# Patient Record
Sex: MALE | Race: WHITE | NOT HISPANIC OR LATINO | Employment: FULL TIME | URBAN - METROPOLITAN AREA
[De-identification: names, ages, dates, MRNs, and addresses within clinical notes are randomized per-mention and may not be internally consistent; named-entity substitution may affect disease eponyms.]

---

## 2017-07-24 ENCOUNTER — OFFICE VISIT (OUTPATIENT)
Dept: CARDIOLOGY | Facility: MEDICAL CENTER | Age: 52
End: 2017-07-24
Payer: COMMERCIAL

## 2017-07-24 VITALS
DIASTOLIC BLOOD PRESSURE: 68 MMHG | WEIGHT: 228 LBS | OXYGEN SATURATION: 94 % | BODY MASS INDEX: 30.88 KG/M2 | HEIGHT: 72 IN | SYSTOLIC BLOOD PRESSURE: 108 MMHG | HEART RATE: 64 BPM

## 2017-07-24 DIAGNOSIS — E88.810 DYSMETABOLIC SYNDROME X: ICD-10-CM

## 2017-07-24 DIAGNOSIS — I10 ESSENTIAL HYPERTENSION: ICD-10-CM

## 2017-07-24 PROCEDURE — 99213 OFFICE O/P EST LOW 20 MIN: CPT | Performed by: INTERNAL MEDICINE

## 2017-07-24 ASSESSMENT — ENCOUNTER SYMPTOMS
BLURRED VISION: 0
DIZZINESS: 0
NAUSEA: 0
PND: 0
EYE DISCHARGE: 0
MYALGIAS: 0
HEARTBURN: 0
HEADACHES: 0
COUGH: 0
FEVER: 0
BRUISES/BLEEDS EASILY: 0
DEPRESSION: 0
PALPITATIONS: 0
CHILLS: 0
SHORTNESS OF BREATH: 0
NERVOUS/ANXIOUS: 0

## 2017-07-24 NOTE — MR AVS SNAPSHOT
"        Fabio Bolaños   2017 2:45 PM   Office Visit   MRN: [id number]    Department:  [facility]   Dept Phone:  [phone]    Description:  Male : 1965   Provider:  Noah Jon M.D.           Reason for Visit     Follow-Up           Allergies as of 2017     No Known Allergies      You were diagnosed with     Essential hypertension   [1490136]       Dysmetabolic syndrome X   [277.7.ICD-9-CM]         Vital Signs     Blood Pressure Pulse Height Weight Body Mass Index Oxygen Saturation    108/68 mmHg 64 1.829 m (6' 0.01\") 103.42 kg (228 lb) 30.92 kg/m2 94%    Smoking Status                   Never Smoker            Basic Information     Date Of Birth Sex Race Ethnicity Preferred Language    1965 Male White Non- English      Problem List              ICD-10-CM Priority Class Noted - Resolved    Dysmetabolic syndrome X (Chronic) E88.81   2011 - Present    Hyperlipoproteinemia (Chronic) E78.5   2011 - Present    Sleep apnea (Chronic) G47.30   2011 - Present    HTN (hypertension) I10   3/9/2015 - Present      Health Maintenance        Date Due Completion Dates    IMM DTaP/Tdap/Td Vaccine (1 - Tdap) 10/9/1982 ---    COLONOSCOPY 10/9/2013 ---    IMM INFLUENZA (1) 2017 ---            Current Immunizations     No immunizations on file.      Below and/or attached are the medications your provider expects you to take. Review all of your home medications and newly ordered medications with your provider and/or pharmacist. Follow medication instructions as directed by your provider and/or pharmacist. Please keep your medication list with you and share with your provider. Update the information when medications are discontinued, doses are changed, or new medications (including over-the-counter products) are added; and carry medication information at all times in the event of emergency situations     Allergies:  No Known Allergies          Medications  Valid as " of: July 24, 2017 -  3:31 PM    Generic Name Brand Name Tablet Size Instructions for use    Aspirin (Tablet Delayed Response) ECOTRIN 81 MG Take 81 mg by mouth every day.          Celecoxib (Cap) CELEBREX 200 MG Take 200 mg by mouth as needed.        Choline Fenofibrate (CAPSULE DELAYED RELEASE) Fenofibric Acid 135 MG Take 1 Cap by mouth every day.        Choline Fenofibrate   Take  by mouth.        Olmesartan Medoxomil (Tab) BENICAR 20 MG Take 0.5 Tabs by mouth every day.        Omega-3-acid Ethyl Esters (Cap) LOVAZA 1 GM Take 4 tablets daily        Simvastatin (Tab) ZOCOR 40 MG Take 1 Tab by mouth every evening.        .                 Medicines prescribed today were sent to:     [facility] [address] [place], [place] - [address] [facility]    [address] [facility] [address]    Phone: [phone] Fax: [phone]    Open 24 Hours?: No    [facility] - [place], [place] - [address] AT PORTAL TO [facility]    [address] [facility] [address]    Phone: [phone] Fax: [phone]    Open 24 Hours?: No    [id number] - [place], [place] - [address]    [address] [facility] [address]    Phone: [phone] Fax: [phone]    Open 24 Hours?: No      Medication refill instructions:       If your prescription bottle indicates you have medication refills left, it is not necessary to call your provider’s office. Please contact your pharmacy and they will refill your medication.    If your prescription bottle indicates you do not have any refills left, you may request refills at any time through one of the following ways: The online "[employer] system (except Urgent Care), by calling your provider’s office, or by asking your pharmacy to contact your provider’s office with a refill request. Medication refills are processed only during regular business hours and may not be available until the next business day. Your provider  may request additional information or to have a follow-up visit with you prior to refilling your medication.   *Please Note: Medication refills are assigned a new Rx number when refilled electronically. Your pharmacy may indicate that no refills were authorized even though a new prescription for the same medication is available at the pharmacy. Please request the medicine by name with the pharmacy before contacting your provider for a refill.        Your To Do List     Future Labs/Procedures Complete By Expires    CT-CARDIAC SCORING  As directed 7/24/2018         Grupo [employer] Access Code: [email or web address]  Expires: 8/23/2017  2:43 PM    Grupo A  A secure, online tool to manage your health information     [employer]’s Grupo A® is a secure, online tool that connects you to your personalized health information from the privacy of your home -- day or night - making it very easy for you to manage your healthcare. Once the activation process is completed, you can even access your medical information using the Grupo A linda, which is available for free in the Apple Linda store or Google Play store.     Grupo A provides the following levels of access (as shown below):   My Chart Features   [facility] Doctor [facility]  Urgent  Care [facility]  Primary Care  Doctor   Email your healthcare team securely and privately 24/7 X X X    Manage appointments: schedule your next appointment; view details of past/upcoming appointments X      Request prescription refills. X      View recent personal medical records, including lab and immunizations X X X X   View health record, including health history, allergies, medications X X X X   Read reports about your outpatient visits, procedures, consult and ER notes X X X X   See your discharge summary, which is a recap of your hospital and/or ER visit that includes your diagnosis, lab results, and care plan. X X       How to register for Grupo A:  1. Go to   [email or web address].  2. Click on the Sign Up Now box, which takes you to the New Member Sign Up page. You will need to provide the following information:  a. Enter your [employer] Access Code exactly as it appears at the top of this page. (You will not need to use this code after you’ve completed the sign-up process. If you do not sign up before the expiration date, you must request a new code.)   b. Enter your date of birth.   c. Enter your home email address.   d. Click Submit, and follow the next screen’s instructions.  3. Create a Sketchfab ID. This will be your Sketchfab login ID and cannot be changed, so think of one that is secure and easy to remember.  4. Create a [employer] password. You can change your password at any time.  5. Enter your Password Reset Question and Answer. This can be used at a later time if you forget your password.   6. Enter your e-mail address. This allows you to receive e-mail notifications when new information is available in Sketchfab.  7. Click Sign Up. You can now view your health information.    For assistance activating your S[employer] account, call [phone]

## 2017-07-24 NOTE — PROGRESS NOTES
Subjective:   Fabio Bolaños is a 51 y.o. male who presents today in annual follow-up for metabolic syndrome and labile hypertension.  He's been taking half the dose Benicar and half dose simvastatin because he noticed the pills were somewhat larger.  He has no symptoms of heart disease.  Weight fluctuates up and most recently down, but still carrying extra weight.    Recent lab work demonstrates a stable creatinine of 1.28. LDL is 105. He used to be 98. HDL remains low. Mild elevation of triglycerides.    Past Medical History   Diagnosis Date   • Dysmetabolic syndrome X 5/25/2011   • Hyperlipoproteinemia 5/25/2011   • Labile hypertension 5/25/2011   • Sleep apnea 5/25/2011     No past surgical history on file.  No family history on file.  History   Smoking status   • Never Smoker    Smokeless tobacco   • Never Used     No Known Allergies  Outpatient Encounter Prescriptions as of 7/24/2017   Medication Sig Dispense Refill   • Choline Fenofibrate (TRILIPIX PO) Take  by mouth.     • olmesartan (BENICAR) 20 MG Tab Take 0.5 Tabs by mouth every day. 45 Tab 3   • simvastatin (ZOCOR) 40 MG TABS Take 1 Tab by mouth every evening. 90 Tab 3   • omega-3 acid ethyl esters (LOVAZA) 1 GM capsule Take 4 tablets daily 56 Cap 0   • aspirin EC (ECOTRIN) 81 MG TBEC Take 81 mg by mouth every day.       • celecoxib (CELEBREX) 200 MG CAPS Take 200 mg by mouth as needed.     • Choline Fenofibrate (FENOFIBRIC ACID) 135 MG CAPSULE DELAYED RELEASE Take 1 Cap by mouth every day. 90 Cap 3     No facility-administered encounter medications on file as of 7/24/2017.     Review of Systems   Constitutional: Negative for fever, chills and malaise/fatigue.   Eyes: Negative for blurred vision and discharge.   Respiratory: Negative for cough and shortness of breath.    Cardiovascular: Negative for chest pain, palpitations, leg swelling and PND.   Gastrointestinal: Negative for heartburn and nausea.   Genitourinary: Negative for dysuria and  "urgency.   Musculoskeletal: Negative for myalgias.   Skin: Negative for itching and rash.   Neurological: Negative for dizziness and headaches.   Endo/Heme/Allergies: Negative for environmental allergies. Does not bruise/bleed easily.   Psychiatric/Behavioral: Negative for depression. The patient is not nervous/anxious.         Objective:   /68 mmHg  Pulse 64  Ht 1.829 m (6' 0.01\")  Wt 103.42 kg (228 lb)  BMI 30.92 kg/m2  SpO2 94%    Physical Exam   Constitutional: He is oriented to person, place, and time. He appears well-developed and well-nourished.   HENT:   Mouth/Throat: Oropharynx is clear and moist.   Eyes: Conjunctivae and EOM are normal.   Neck: No JVD present. No thyroid mass present.   Cardiovascular: Normal rate, regular rhythm, S1 normal, S2 normal and normal pulses.  PMI is not displaced.  Exam reveals no gallop.    No murmur heard.  Pulses:       Carotid pulses are 2+ on the right side, and 2+ on the left side.       Radial pulses are 2+ on the right side, and 2+ on the left side.        Femoral pulses are 2+ on the right side, and 2+ on the left side.       Dorsalis pedis pulses are 2+ on the right side, and 2+ on the left side.   No peripheral edema.   Pulmonary/Chest: Effort normal and breath sounds normal.   Abdominal: Soft. Normal appearance. He exhibits no abdominal bruit. There is no hepatosplenomegaly. There is no tenderness.   Musculoskeletal: Normal range of motion. He exhibits no edema.        Thoracic back: He exhibits no tenderness and no spasm.   Neurological: He is alert and oriented to person, place, and time.   Skin: No rash noted. No cyanosis. Nails show no clubbing.   Psychiatric: He has a normal mood and affect.       Assessment:     1. Essential hypertension     2. Dysmetabolic syndrome X  CT-CARDIAC SCORING       Medical Decision Making:  Today's Assessment / Status / Plan:   We'll arrange for CAT scan for coronary artery calcification scoring so that we can  how " aggressive to be regarding treatment of his LDL cholesterol in the setting of low HDL.

## 2017-07-24 NOTE — Clinical Note
[facility]   [address]   [address]  [place] [address]  Phone: [phone]  Fax: [phone]              Fabio Bolaños  1965    Encounter Date: 7/24/2017    Noah Jon M.D.          PROGRESS NOTE:  Subjective:   Fabio Bolaños is a 51 y.o. male who presents today in annual follow-up for metabolic syndrome and labile hypertension.  He's been taking half the dose Benicar and half dose simvastatin because he noticed the pills were somewhat larger.  He has no symptoms of heart disease.  Weight fluctuates up and most recently down, but still carrying extra weight.    Recent lab work demonstrates a stable creatinine of 1.28. LDL is 105. He used to be 98. HDL remains low. Mild elevation of triglycerides.    Past Medical History   Diagnosis Date   • Dysmetabolic syndrome X 5/25/2011   • Hyperlipoproteinemia 5/25/2011   • Labile hypertension 5/25/2011   • Sleep apnea 5/25/2011     No past surgical history on file.  No family history on file.  History   Smoking status   • Never Smoker    Smokeless tobacco   • Never Used     No Known Allergies  Outpatient Encounter Prescriptions as of 7/24/2017   Medication Sig Dispense Refill   • Choline Fenofibrate (TRILIPIX PO) Take  by mouth.     • olmesartan (BENICAR) 20 MG Tab Take 0.5 Tabs by mouth every day. 45 Tab 3   • simvastatin (ZOCOR) 40 MG TABS Take 1 Tab by mouth every evening. 90 Tab 3   • omega-3 acid ethyl esters (LOVAZA) 1 GM capsule Take 4 tablets daily 56 Cap 0   • aspirin EC (ECOTRIN) 81 MG TBEC Take 81 mg by mouth every day.       • celecoxib (CELEBREX) 200 MG CAPS Take 200 mg by mouth as needed.     • Choline Fenofibrate (FENOFIBRIC ACID) 135 MG CAPSULE DELAYED RELEASE Take 1 Cap by mouth every day. 90 Cap 3     No facility-administered encounter medications on file as of 7/24/2017.     Review of Systems   Constitutional: Negative for fever, chills and  "malaise/fatigue.   Eyes: Negative for blurred vision and discharge.   Respiratory: Negative for cough and shortness of breath.    Cardiovascular: Negative for chest pain, palpitations, leg swelling and PND.   Gastrointestinal: Negative for heartburn and nausea.   Genitourinary: Negative for dysuria and urgency.   Musculoskeletal: Negative for myalgias.   Skin: Negative for itching and rash.   Neurological: Negative for dizziness and headaches.   Endo/Heme/Allergies: Negative for environmental allergies. Does not bruise/bleed easily.   Psychiatric/Behavioral: Negative for depression. The patient is not nervous/anxious.         Objective:   /68 mmHg  Pulse 64  Ht 1.829 m (6' 0.01\")  Wt 103.42 kg (228 lb)  BMI 30.92 kg/m2  SpO2 94%    Physical Exam   Constitutional: He is oriented to person, place, and time. He appears well-developed and well-nourished.   HENT:   Mouth/Throat: Oropharynx is clear and moist.   Eyes: Conjunctivae and EOM are normal.   Neck: No JVD present. No thyroid mass present.   Cardiovascular: Normal rate, regular rhythm, S1 normal, S2 normal and normal pulses.  PMI is not displaced.  Exam reveals no gallop.    No murmur heard.  Pulses:       Carotid pulses are 2+ on the right side, and 2+ on the left side.       Radial pulses are 2+ on the right side, and 2+ on the left side.        Femoral pulses are 2+ on the right side, and 2+ on the left side.       Dorsalis pedis pulses are 2+ on the right side, and 2+ on the left side.   No peripheral edema.   Pulmonary/Chest: Effort normal and breath sounds normal.   Abdominal: Soft. Normal appearance. He exhibits no abdominal bruit. There is no hepatosplenomegaly. There is no tenderness.   Musculoskeletal: Normal range of motion. He exhibits no edema.        Thoracic back: He exhibits no tenderness and no spasm.   Neurological: He is alert and oriented to person, place, and time.   Skin: No rash noted. No cyanosis. Nails show no clubbing.   "   Psychiatric: He has a normal mood and affect.       Assessment:     1. Essential hypertension     2. Dysmetabolic syndrome X  CT-CARDIAC SCORING       Medical Decision Making:  Today's Assessment / Status / Plan:   We'll arrange for CAT scan for coronary artery calcification scoring so that we can  how aggressive to be regarding treatment of his LDL cholesterol in the setting of low HDL.        John Temple M.D.  [address] [place] [address]  VIA Facsimile: [phone]

## 2017-08-01 ENCOUNTER — TELEPHONE (OUTPATIENT)
Dept: CARDIOLOGY | Facility: MEDICAL CENTER | Age: 52
End: 2017-08-01

## 2017-08-01 ENCOUNTER — HOSPITAL ENCOUNTER (OUTPATIENT)
Dept: RADIOLOGY | Facility: MEDICAL CENTER | Age: 52
End: 2017-08-01
Attending: INTERNAL MEDICINE
Payer: COMMERCIAL

## 2017-08-01 DIAGNOSIS — E88.810 DYSMETABOLIC SYNDROME X: ICD-10-CM

## 2017-08-01 PROCEDURE — 4410556 CT-CARDIAC SCORING

## 2017-08-02 NOTE — TELEPHONE ENCOUNTER
----- Message from Noah Jon M.D. sent at 8/1/2017  5:21 PM PDT -----  MINIMAL CALCIUM IN CA. NO NEED TO PUSH CHOL LOWER. CONTINUE SIMVA

## 2017-08-03 DIAGNOSIS — R09.89 LABILE HYPERTENSION: ICD-10-CM

## 2017-08-03 DIAGNOSIS — E78.5 HYPERLIPOPROTEINEMIA: ICD-10-CM

## 2017-08-03 DIAGNOSIS — E88.810 DYSMETABOLIC SYNDROME X: Chronic | ICD-10-CM

## 2017-08-03 DIAGNOSIS — E78.5 HYPERLIPIDEMIA, UNSPECIFIED HYPERLIPIDEMIA TYPE: ICD-10-CM

## 2017-08-03 DIAGNOSIS — I10 ESSENTIAL HYPERTENSION: ICD-10-CM

## 2017-08-03 RX ORDER — OMEGA-3-ACID ETHYL ESTERS 1 G/1
CAPSULE, LIQUID FILLED ORAL
Qty: 360 CAP | Refills: 3 | Status: CANCELLED | OUTPATIENT
Start: 2017-08-03

## 2017-08-03 RX ORDER — OMEGA-3-ACID ETHYL ESTERS 1 G/1
CAPSULE, LIQUID FILLED ORAL
Qty: 360 CAP | Refills: 3 | Status: SHIPPED | OUTPATIENT
Start: 2017-08-03 | End: 2018-06-20

## 2017-08-03 RX ORDER — OLMESARTAN MEDOXOMIL 20 MG/1
10 TABLET ORAL DAILY
Qty: 45 TAB | Refills: 3 | Status: CANCELLED | OUTPATIENT
Start: 2017-08-03

## 2017-08-03 RX ORDER — SIMVASTATIN 40 MG
40 TABLET ORAL EVERY EVENING
Qty: 90 TAB | Refills: 3 | Status: CANCELLED | OUTPATIENT
Start: 2017-08-03

## 2017-08-03 RX ORDER — SIMVASTATIN 20 MG
20 TABLET ORAL EVERY EVENING
Qty: 90 TAB | Refills: 3 | Status: SHIPPED | OUTPATIENT
Start: 2017-08-03 | End: 2017-08-03 | Stop reason: SDUPTHER

## 2017-08-03 RX ORDER — OMEGA-3-ACID ETHYL ESTERS 1 G/1
CAPSULE, LIQUID FILLED ORAL
Qty: 360 CAP | Refills: 3 | Status: SHIPPED | OUTPATIENT
Start: 2017-08-03 | End: 2017-08-03 | Stop reason: SDUPTHER

## 2017-08-03 RX ORDER — SIMVASTATIN 20 MG
20 TABLET ORAL EVERY EVENING
Qty: 90 TAB | Refills: 3 | Status: SHIPPED | OUTPATIENT
Start: 2017-08-03 | End: 2018-06-20

## 2017-08-03 RX ORDER — OLMESARTAN MEDOXOMIL 5 MG/1
10 TABLET ORAL DAILY
Qty: 180 TAB | Refills: 3 | Status: SHIPPED | OUTPATIENT
Start: 2017-08-03 | End: 2019-04-23

## 2017-08-03 RX ORDER — OLMESARTAN MEDOXOMIL 5 MG/1
10 TABLET ORAL DAILY
Qty: 180 TAB | Refills: 3 | Status: SHIPPED | OUTPATIENT
Start: 2017-08-03 | End: 2017-08-03 | Stop reason: SDUPTHER

## 2018-06-18 ENCOUNTER — TELEPHONE (OUTPATIENT)
Dept: CARDIOLOGY | Facility: MEDICAL CENTER | Age: 53
End: 2018-06-18

## 2018-06-18 ENCOUNTER — HOSPITAL ENCOUNTER (OUTPATIENT)
Dept: LAB | Facility: MEDICAL CENTER | Age: 53
End: 2018-06-18
Attending: INTERNAL MEDICINE
Payer: COMMERCIAL

## 2018-06-18 DIAGNOSIS — E78.5 HYPERLIPOPROTEINEMIA: Chronic | ICD-10-CM

## 2018-06-18 LAB
ALBUMIN SERPL BCP-MCNC: 4.5 G/DL (ref 3.2–4.9)
ALBUMIN/GLOB SERPL: 1.9 G/DL
ALP SERPL-CCNC: 23 U/L (ref 30–99)
ALT SERPL-CCNC: 17 U/L (ref 2–50)
ANION GAP SERPL CALC-SCNC: 6 MMOL/L (ref 0–11.9)
AST SERPL-CCNC: 13 U/L (ref 12–45)
BILIRUB SERPL-MCNC: 0.6 MG/DL (ref 0.1–1.5)
BUN SERPL-MCNC: 18 MG/DL (ref 8–22)
CALCIUM SERPL-MCNC: 9.6 MG/DL (ref 8.5–10.5)
CHLORIDE SERPL-SCNC: 105 MMOL/L (ref 96–112)
CHOLEST SERPL-MCNC: 134 MG/DL (ref 100–199)
CO2 SERPL-SCNC: 26 MMOL/L (ref 20–33)
CREAT SERPL-MCNC: 1.19 MG/DL (ref 0.5–1.4)
GLOBULIN SER CALC-MCNC: 2.4 G/DL (ref 1.9–3.5)
GLUCOSE SERPL-MCNC: 92 MG/DL (ref 65–99)
HDLC SERPL-MCNC: 36 MG/DL
LDLC SERPL CALC-MCNC: 71 MG/DL
POTASSIUM SERPL-SCNC: 4.2 MMOL/L (ref 3.6–5.5)
PROT SERPL-MCNC: 6.9 G/DL (ref 6–8.2)
SODIUM SERPL-SCNC: 137 MMOL/L (ref 135–145)
TRIGL SERPL-MCNC: 137 MG/DL (ref 0–149)

## 2018-06-18 PROCEDURE — 80053 COMPREHEN METABOLIC PANEL: CPT

## 2018-06-18 PROCEDURE — 36415 COLL VENOUS BLD VENIPUNCTURE: CPT

## 2018-06-18 PROCEDURE — 80061 LIPID PANEL: CPT

## 2018-06-18 NOTE — TELEPHONE ENCOUNTER
----- Message from Anca Mcfadden sent at 6/18/2018  7:40 AM PDT -----  Regarding: Patient needs lab order put into [facility]  IVETT/Louisa    Patient wants to have his lab work done this morning and needs the lab order put into [facility] so he can go to the [facility].

## 2018-06-20 ENCOUNTER — OFFICE VISIT (OUTPATIENT)
Dept: CARDIOLOGY | Facility: MEDICAL CENTER | Age: 53
End: 2018-06-20
Payer: COMMERCIAL

## 2018-06-20 VITALS
OXYGEN SATURATION: 96 % | HEIGHT: 71 IN | WEIGHT: 229 LBS | BODY MASS INDEX: 32.06 KG/M2 | SYSTOLIC BLOOD PRESSURE: 120 MMHG | HEART RATE: 68 BPM | DIASTOLIC BLOOD PRESSURE: 74 MMHG

## 2018-06-20 DIAGNOSIS — E78.5 HYPERLIPOPROTEINEMIA: ICD-10-CM

## 2018-06-20 DIAGNOSIS — I10 ESSENTIAL HYPERTENSION: ICD-10-CM

## 2018-06-20 PROCEDURE — 99213 OFFICE O/P EST LOW 20 MIN: CPT | Performed by: INTERNAL MEDICINE

## 2018-06-20 ASSESSMENT — ENCOUNTER SYMPTOMS
BRUISES/BLEEDS EASILY: 0
PALPITATIONS: 0
PND: 0
NAUSEA: 0
HEARTBURN: 0
EYE DISCHARGE: 0
MYALGIAS: 0
FEVER: 0
COUGH: 0
DEPRESSION: 0
BLURRED VISION: 0
HEADACHES: 0
SHORTNESS OF BREATH: 0
NERVOUS/ANXIOUS: 0
CHILLS: 0
DIZZINESS: 0

## 2018-06-20 NOTE — LETTER
[facility]   [address]   [address]   [place] [address]  Phone: [phone]  Fax: [phone]              Fabio Bolaños  1965    Encounter Date: 6/20/2018    Noah Jon M.D.          PROGRESS NOTE:  Chief Complaint   Patient presents with   • HTN (Controlled)       Subjective:   Fabio Bolaños is a 52 y.o. male who presents today   In follow-up for hypertension and low HDL and moderate elevation of triglycerides.  In August of last year coronary artery calcification score was 2    His blood pressures remain normal on low-dose Benicar 10 mg per day.  He is doing a lot more physical activity than he used to do with no symptoms.  No other incidental medical issues.  Past Medical History:   Diagnosis Date   • Dysmetabolic syndrome X 5/25/2011   • Hyperlipoproteinemia 5/25/2011   • Labile hypertension 5/25/2011   • Sleep apnea 5/25/2011     History reviewed. No pertinent surgical history.  History reviewed. No pertinent family history.  Social History     Social History   • Marital status:      Spouse name: N/A   • Number of children: N/A   • Years of education: N/A     Occupational History   • Not on file.     Social History Main Topics   • Smoking status: Never Smoker   • Smokeless tobacco: Never Used   • Alcohol use No   • Drug use: Unknown   • Sexual activity: Not on file     Other Topics Concern   • Not on file     Social History Narrative   • No narrative on file     No Known Allergies  Outpatient Encounter Prescriptions as of 6/20/2018   Medication Sig Dispense Refill   • olmesartan (BENICAR) 5 MG tablet Take 2 Tabs by mouth every day. BRAND NAME PER PATIENT 180 Tab 3   • aspirin EC (ECOTRIN) 81 MG TBEC Take 81 mg by mouth every day.       • celecoxib (CELEBREX) 200 MG CAPS Take 200 mg by mouth as needed.     • [DISCONTINUED] choline fenofibrate (TRILIPIX) 135 MG CAPSULE DELAYED RELEASE Take 1 Cap by  "mouth every day. 90 Tab 3   • [DISCONTINUED] simvastatin (ZOCOR) 20 MG Tab Take 1 Tab by mouth every evening. 90 Tab 3   • [DISCONTINUED] omega-3 acid ethyl esters (LOVAZA) 1 GM capsule Take 4 tablets daily 360 Cap 3   • [DISCONTINUED] Choline Fenofibrate (FENOFIBRIC ACID) 135 MG CAPSULE DELAYED RELEASE Take 1 Cap by mouth every day. 90 Cap 3     No facility-administered encounter medications on file as of 6/20/2018.      Review of Systems   Constitutional: Negative for chills, fever and malaise/fatigue.   Eyes: Negative for blurred vision and discharge.   Respiratory: Negative for cough and shortness of breath.    Cardiovascular: Negative for chest pain, palpitations, leg swelling and PND.   Gastrointestinal: Negative for heartburn and nausea.   Genitourinary: Negative for dysuria and urgency.   Musculoskeletal: Negative for myalgias.   Skin: Negative for itching and rash.   Neurological: Negative for dizziness and headaches.   Endo/Heme/Allergies: Negative for environmental allergies. Does not bruise/bleed easily.   Psychiatric/Behavioral: Negative for depression. The patient is not nervous/anxious.         Objective:   /74   Pulse 68   Ht 1.803 m (5' 11\")   Wt 103.9 kg (229 lb)   SpO2 96%   BMI 31.94 kg/m²      Physical Exam   Constitutional: He is oriented to person, place, and time. He appears well-developed and well-nourished.   Cardiovascular: Normal rate and regular rhythm.  Exam reveals no gallop and no friction rub.    No murmur heard.  Pulmonary/Chest: Effort normal and breath sounds normal.   Abdominal: Soft.   Musculoskeletal: He exhibits no edema.   Neurological: He is alert and oriented to person, place, and time.   Skin: Skin is warm and dry.       Assessment:     1. Hyperlipoproteinemia  COMP METABOLIC PANEL    LIPID PANEL   2. Essential hypertension         Medical Decision Making:  Today's Assessment / Status / Plan:   List on the reassuring coronary calcium score of 2 will discontinue " Trilipix simvastatin and Lovenox  He will reduce Benicar to 5 mg per day at his request to make sure home blood pressures stay less than 130/90.  Comprehensive metabolic panel and lipid panel in brief follow-up thereafter.      John Temple M.D.  [address] [place] [address]  VIA Facsimile: [phone]

## 2018-06-20 NOTE — PROGRESS NOTES
Chief Complaint   Patient presents with   • HTN (Controlled)       Subjective:   Fabio Bolaños is a 52 y.o. male who presents today   In follow-up for hypertension and low HDL and moderate elevation of triglycerides.  In August of last year coronary artery calcification score was 2    His blood pressures remain normal on low-dose Benicar 10 mg per day.  He is doing a lot more physical activity than he used to do with no symptoms.  No other incidental medical issues.  Past Medical History:   Diagnosis Date   • Dysmetabolic syndrome X 5/25/2011   • Hyperlipoproteinemia 5/25/2011   • Labile hypertension 5/25/2011   • Sleep apnea 5/25/2011     History reviewed. No pertinent surgical history.  History reviewed. No pertinent family history.  Social History     Social History   • Marital status:      Spouse name: N/A   • Number of children: N/A   • Years of education: N/A     Occupational History   • Not on file.     Social History Main Topics   • Smoking status: Never Smoker   • Smokeless tobacco: Never Used   • Alcohol use No   • Drug use: Unknown   • Sexual activity: Not on file     Other Topics Concern   • Not on file     Social History Narrative   • No narrative on file     No Known Allergies  Outpatient Encounter Prescriptions as of 6/20/2018   Medication Sig Dispense Refill   • olmesartan (BENICAR) 5 MG tablet Take 2 Tabs by mouth every day. BRAND NAME PER PATIENT 180 Tab 3   • aspirin EC (ECOTRIN) 81 MG TBEC Take 81 mg by mouth every day.       • celecoxib (CELEBREX) 200 MG CAPS Take 200 mg by mouth as needed.     • [DISCONTINUED] choline fenofibrate (TRILIPIX) 135 MG CAPSULE DELAYED RELEASE Take 1 Cap by mouth every day. 90 Tab 3   • [DISCONTINUED] simvastatin (ZOCOR) 20 MG Tab Take 1 Tab by mouth every evening. 90 Tab 3   • [DISCONTINUED] omega-3 acid ethyl esters (LOVAZA) 1 GM capsule Take 4 tablets daily 360 Cap 3   • [DISCONTINUED] Choline Fenofibrate (FENOFIBRIC ACID) 135 MG CAPSULE DELAYED  "RELEASE Take 1 Cap by mouth every day. 90 Cap 3     No facility-administered encounter medications on file as of 6/20/2018.      Review of Systems   Constitutional: Negative for chills, fever and malaise/fatigue.   Eyes: Negative for blurred vision and discharge.   Respiratory: Negative for cough and shortness of breath.    Cardiovascular: Negative for chest pain, palpitations, leg swelling and PND.   Gastrointestinal: Negative for heartburn and nausea.   Genitourinary: Negative for dysuria and urgency.   Musculoskeletal: Negative for myalgias.   Skin: Negative for itching and rash.   Neurological: Negative for dizziness and headaches.   Endo/Heme/Allergies: Negative for environmental allergies. Does not bruise/bleed easily.   Psychiatric/Behavioral: Negative for depression. The patient is not nervous/anxious.         Objective:   /74   Pulse 68   Ht 1.803 m (5' 11\")   Wt 103.9 kg (229 lb)   SpO2 96%   BMI 31.94 kg/m²     Physical Exam   Constitutional: He is oriented to person, place, and time. He appears well-developed and well-nourished.   Cardiovascular: Normal rate and regular rhythm.  Exam reveals no gallop and no friction rub.    No murmur heard.  Pulmonary/Chest: Effort normal and breath sounds normal.   Abdominal: Soft.   Musculoskeletal: He exhibits no edema.   Neurological: He is alert and oriented to person, place, and time.   Skin: Skin is warm and dry.       Assessment:     1. Hyperlipoproteinemia  COMP METABOLIC PANEL    LIPID PANEL   2. Essential hypertension         Medical Decision Making:  Today's Assessment / Status / Plan:   List on the reassuring coronary calcium score of 2 will discontinue Trilipix simvastatin and Lovenox  He will reduce Benicar to 5 mg per day at his request to make sure home blood pressures stay less than 130/90.  Comprehensive metabolic panel and lipid panel in brief follow-up thereafter.  "

## 2018-07-02 ENCOUNTER — OCCUPATIONAL MEDICINE (OUTPATIENT)
Dept: URGENT CARE | Facility: CLINIC | Age: 53
End: 2018-07-02
Payer: COMMERCIAL

## 2018-07-02 VITALS
HEART RATE: 65 BPM | RESPIRATION RATE: 16 BRPM | WEIGHT: 229 LBS | SYSTOLIC BLOOD PRESSURE: 120 MMHG | HEIGHT: 71 IN | BODY MASS INDEX: 32.06 KG/M2 | OXYGEN SATURATION: 99 % | TEMPERATURE: 97.6 F | DIASTOLIC BLOOD PRESSURE: 68 MMHG

## 2018-07-02 DIAGNOSIS — M77.8 LEFT WRIST TENDONITIS: ICD-10-CM

## 2018-07-02 PROCEDURE — 99203 OFFICE O/P NEW LOW 30 MIN: CPT | Performed by: NURSE PRACTITIONER

## 2018-07-02 ASSESSMENT — ENCOUNTER SYMPTOMS
CONSTITUTIONAL NEGATIVE: 1
RESPIRATORY NEGATIVE: 1
GASTROINTESTINAL NEGATIVE: 1
NEUROLOGICAL NEGATIVE: 1
CARDIOVASCULAR NEGATIVE: 1

## 2018-07-02 NOTE — LETTER
"EMPLOYEE’S CLAIM FOR COMPENSATION/ REPORT OF INITIAL TREATMENT  FORM C-4    EMPLOYEE’S CLAIM - PROVIDE ALL INFORMATION REQUESTED   First Name  Fabio Bustamante Last Name  Miky Birthdate                    1965                Sex  male Claim Number   Home Address  [address] Age  52 y.o. Height  1.803 m (5' 11\") Weight  103.9 kg (229 lb) [facility]     [facility] [place] [facility] Zip  [address] Telephone  [phone] (home)    Mailing Address  [address] [facility] [place] [facility] [place] Zip  [address] Primary Language Spoken  English    Insurer  unknown Third Party   [employer]   Employee's Occupation (Job Title) When Injury or Occupational Disease Occurred      Employer's Name    [employer] Telephone   [phone]   Employer Address   [address] [facility] [place]   [address]   Date of Injury                 Hour of Injury   Date Employer Notified   Last Day of Work after Injury or Occupational Disease   Supervisor to Whom Injury Reported     Address or Location of Accident (if applicable)     What were you doing at the time of accident? (if applicable)      How did this injury or occupational disease occur? (Be specific an answer in detail. Use additional sheet if necessary)     If you believe that you have an occupational disease, when did you first have knowledge of the disability and it relationship to your employment?   Witnesses to the Accident        Nature of Injury or Occupational Disease    Part(s) of Body Injured or Affected  , ,     I certify that the above is true and correct to the best of my knowledge and that I have provided this information in order to obtain the benefits of [place]  I hereby authorize any physician, chiropractor, surgeon, practitioner, or other person, any " hospital, including [facility] or [facility] hospital, any medical service organization, any insurance company, or other institution or organization to release to each other, any medical or other information, including benefits paid or payable, pertinent to this injury or disease, except information relative to diagnosis, treatment and/or counseling for AIDS, psychological conditions, alcohol or controlled substances, for which I must give specific authorization.  A Photostat of this authorization shall be as valid as the original.     Date   Place   Employee’s Signature   THIS REPORT MUST BE COMPLETED AND MAILED WITHIN 3 WORKING DAYS OF TREATMENT   Place  [facility]  Name of Facility  [facility]   Date  7/2/2018 Diagnosis  (M77.8) Left wrist tendonitis Is there evidence the injured employee was under the influence of alcohol and/or another controlled substance at the time of accident?   Hour  4:42 PM Description of Injury or Disease  The encounter diagnosis was Left wrist tendonitis. No   Treatment  OTC NSAIDS, velcro wrist splint, work restrictions, RICE, RTC in one week for follow up  Have you advised the patient to remain off work five days or more? No   X-Ray Findings    Comments:N/A   If Yes   From Date  To Date      From information given by the employee, together with medical evidence, can you directly connect this injury or occupational disease as job incurred?  Yes If No Full Duty  No Modified Duty  Yes   Is additional medical care by a physician indicated?  Yes If Modified Duty, Specify any Limitations / Restrictions  Per D 39   Do you know of any previous injury or disease contributing to this condition or occupational disease?                            No   Date  7/2/2018 Print Doctor’s Name KAELA Vazquez I certify the employer’s copy of  this form was mailed on:   Address  [address] [facility]     [place] Zip  [address]    Provider’s Tax  "ID Number  [id number] Telephone  Dept: [phone]        syl-DANNIE Mckeon   e-Signature: Dr. Wilbur Bocanegra, Medical Director Degree  APRN        ORIGINAL-TREATING PHYSICIAN OR CHIROPRACTOR    PAGE 2-INSURER/TPA    PAGE 3-EMPLOYER    PAGE 4-EMPLOYEE             Form C-4 (rev10/07)              BRIEF DESCRIPTION OF RIGHTS AND BENEFITS  (Pursuant to [place] 616C.050)    Notice of Injury or Occupational Disease (Incident Report Form C-1): If an injury or occupational disease (OD) arises out of and in the  course of employment, you must provide written notice to your employer as soon as practicable, but no later than 7 days after the accident or  OD. Your employer shall maintain a sufficient supply of the required forms.    Claim for Compensation (Form C-4): If medical treatment is sought, the form C-4 is available at the place of initial treatment. A completed  \"Claim for Compensation\" (Form C-4) must be filed within 90 days after an accident or OD. The treating physician or chiropractor must,  within 3 working days after treatment, complete and mail to the employer, the employer's insurer and third-party , the Claim for  Compensation.    Medical Treatment: If you require medical treatment for your on-the-job injury or OD, you may be required to select a physician or  chiropractor from a list provided by your workers’ compensation insurer, if it has contracted with an Organization for Managed Care (MCO) or  Preferred Provider Organization (PPO) or providers of health care. If your employer has not entered into a contract with an MCO or PPO, you  may select a physician or chiropractor from the Panel of Physicians and Chiropractors. Any medical costs related to your industrial injury or  OD will be paid by your insurer.    Temporary Total Disability (TTD): If your doctor has certified that you are unable to work for a period of at least 5 consecutive days, or 5  cumulative days in a 20-day period, " or places restrictions on you that your employer does not accommodate, you may be entitled to TTD  compensation.    Temporary Partial Disability (TPD): If the wage you receive upon reemployment is less than the compensation for TTD to which you are  entitled, the insurer may be required to pay you TPD compensation to make up the difference. TPD can only be paid for a maximum of 24  months.    Permanent Partial Disability (PPD): When your medical condition is stable and there is an indication of a PPD as a result of your injury or  OD, within 30 days, your insurer must arrange for an evaluation by a rating physician or chiropractor to determine the degree of your PPD. The  amount of your PPD award depends on the date of injury, the results of the PPD evaluation and your age and wage.    Permanent Total Disability (PTD): If you are medically certified by a treating physician or chiropractor as permanently and totally disabled  and have been granted a PTD status by your insurer, you are entitled to receive monthly benefits not to exceed 66 2/3% of your average  monthly wage. The amount of your PTD payments is subject to reduction if you previously received a PPD award.    Vocational Rehabilitation Services: You may be eligible for vocational rehabilitation services if you are unable to return to the job due to a  permanent physical impairment or permanent restrictions as a result of your injury or occupational disease.    Transportation and Per Cici Reimbursement: You may be eligible for travel expenses and per cici associated with medical treatment.    Reopening: You may be able to reopen your claim if your condition worsens after claim closure.    Appeal Process: If you disagree with a written determination issued by the insurer or the insurer does not respond to your request, you may  appeal to the Department of Administration, , by following the instructions contained in your determination letter.  You must  appeal the determination within 70 days from the date of the determination letter at [address], [address], [place], [place]  [address], or [address], [address], [place], [place] [address]. If you disagree with the  decision, you may appeal to the  Department of Administration, . You must file your appeal within 30 days from the date of the  decision  letter at [address], [address], [place], [place] [address], or [address], [address], [place], [place] [address]. If you  disagree with a decision of an , you may file a petition for judicial review with the District Court. You must do so within 30  days of the Appeal Officer’s decision. You may be represented by an  at your own expense or you may contact the [facility] for possible  representation.    [place]  for Injured Workers (NAIW): If you disagree with a  decision, you may request that NAIW represent you  without charge at an  Hearing. For information regarding denial of benefits, you may contact the [facility] at: [address], [address], [place] [address], [phone], or [address], [address], [place] [address], [phone]    To File a Complaint with the Division: If you wish to file a complaint with the  of the Division of Industrial Relations (DIR),  please contact the Workers’ Compensation Section, [address], [address], [place], [place] [address], telephone ([phone]) [phone], or  [address], [address], [place] [address], telephone [phone].    For assistance with Workers’ Compensation Issues: you may contact the Office of the Governor Consumer Health Assistance, [address] [place] [address], [address], [place], [place] [address], Toll Free 1-465.895.5580, Web site: http://govcha.[facility].nv., E-mail  Lynette@[email or web address].                                                                                                                                                                                                                                    __________________________________________________________________                                                                   _________________                Employee Name / Signature                                                                                                                                                       Date                                                                                                                                                                                                     D-2 (rev. 10/07)

## 2018-07-02 NOTE — LETTER
"   [facility]  [address] [place] [address]  Phone:  [phone] - Fax:  [phone]   [facility] Progress Report and Disability Certification  Date of Service: 7/2/2018   No Show:  No  Date / Time of Next Visit: 7/23/2018 @ 8:00am   Claim Information   Patient Name: Fabio Bolaños  Claim Number:     Employer:   [employer] Date of Injury:      Insurer / TPA: Dominick  ID / SSN:     Occupation:   Diagnosis: The encounter diagnosis was Left wrist tendonitis.    Medical Information   Related to Industrial Injury? 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 Shortly after starting this work, he noticed a \"twinge\" to his left wrist. He continued to finish his 9 hour shift, continuing the motions with the boxes. The pain continued to stay the same throughout the shift. He placed an ace wrap on his wrist for symptom relief the following day. He has been off of work since onset and returned to work today. Upon return to work, he notified his supervisor and was told to come to  for evaluation. Pain is currently rated 2/10 at rest, increases to 8/10 with certain movements. Denies numbness or tingling. He is right hand dominate. He does not have other jobs or contributing factors. Denies previous injuries to this wrist.    Objective Findings: A/Ox4. NAD. Left wrist is normal in appearance. No swelling, erythema, ecchymosis. There is mild tenderness to soft tissue of left distal wrist, medial aspect. Finkelstein negative. Strength 5/5. Full ROM but exhibits pain in all directions. Distal N/V intact. No bony tenderness.    Pre-Existing Condition(s): Denies    Assessment:   Initial Visit    Status: Additional Care Required  Permanent Disability:No    Plan: Medication  Comments:OTC NSAIDS, velcro wrist splint, work restrictions, RICE, RTC in one week for follow up    Diagnostics:   "   Comments:N/A    Comments:       Disability Information   Status: Released to Restricted Duty    From:  7/2/2018  Through: 7/9/2018 Restrictions are: Temporary   Physical Restrictions   Sitting:    Standing:    Stooping:    Bending:      Squatting:    Walking:    Climbing:    Pushing:  < or = to 1 hr/day  Comments:left hand   Pulling:  < or = to 1 hr/day  Comments:left hand Other:    Reaching Above Shoulder (L):   Reaching Above Shoulder (R):       Reaching Below Shoulder (L):    Reaching Below Shoulder (R):      Not to exceed Weight Limits   Carrying(hrs):   Weight Limit(lb): < or = to 10 pounds Lifting(hrs):   Weight  Limit(lb): < or = to 10 pounds   Comments:      Repetitive Actions   Hands: i.e. Fine Manipulations from Grasping: < or = to 1 hr/day  Comments:left hand   Feet: i.e. Operating Foot Controls:     Driving / Operate Machinery:     Physician Name: KAELA Vazquez Physician Signature: DANNIE Barrera e-Signature: Dr. Wilbur Bocanegra, Medical Director   Clinic Name / Location: [address] [place] [address] Clinic Phone Number: Dept: [phone]   Appointment Time: 4:15 Pm Visit Start Time: 4:42 PM   Check-In Time:  4:26 Pm Visit Discharge Time:  11:06am   Original-Treating Physician or Chiropractor    Page 2-Insurer/TPA    Page 3-Employer    Page 4-Employee

## 2018-07-03 NOTE — PROGRESS NOTES
"Subjective:      Fabio Bolaños is a 52 y.o. 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 Shortly after starting this work, he noticed a \"twinge\" to his left wrist. He continued to finish his 9 hour shift, continuing the motions with the boxes. The pain continued to stay the same throughout the shift. He placed an ace wrap on his wrist for symptom relief the following day. He has been off of work since onset and returned to work today. Upon return to work, he notified his supervisor and was told to come to  for evaluation. Pain is currently rated 2/10 at rest, increases to 8/10 with certain movements. Denies numbness or tingling. He is right hand dominate. He does not have other jobs or contributing factors. Denies previous injuries to this wrist.     Past Medical History:   Diagnosis Date   • Dysmetabolic syndrome X 5/25/2011   • Hyperlipoproteinemia 5/25/2011   • Labile hypertension 5/25/2011   • Sleep apnea 5/25/2011     History reviewed. No pertinent surgical history.  Current Outpatient Prescriptions on File Prior to Visit   Medication Sig Dispense Refill   • olmesartan (BENICAR) 5 MG tablet Take 2 Tabs by mouth every day. BRAND NAME PER PATIENT 180 Tab 3   • aspirin EC (ECOTRIN) 81 MG TBEC Take 81 mg by mouth every day.       • celecoxib (CELEBREX) 200 MG CAPS Take 200 mg by mouth as needed.       No current facility-administered medications on file prior to visit.      Patient has no known allergies.    Review of Systems   Constitutional: Negative.    Respiratory: Negative.    Cardiovascular: Negative.    Gastrointestinal: Negative.    Musculoskeletal: Positive for joint pain.   Skin: Negative.    Neurological: Negative.           Objective:     /68   Pulse 65   Temp 36.4 °C (97.6 °F)   Resp 16   Ht 1.803 m (5' 11\")   Wt 103.9 kg (229 lb)   SpO2 99%   BMI 31.94 kg/m²      Physical " Exam   Constitutional: Vital signs are normal. He appears well-developed and well-nourished. He is active. He does not have a sickly appearance. He does not appear ill. No distress.   HENT:   Head: Normocephalic and atraumatic.   Right Ear: External ear normal.   Left Ear: External ear normal.   Nose: Nose normal.   Mouth/Throat: Oropharynx is clear and moist.   Eyes: Conjunctivae are normal. Pupils are equal, round, and reactive to light. Right eye exhibits no discharge. Left eye exhibits no discharge. No scleral icterus.   Neck: Normal range of motion. No JVD present. No tracheal deviation present.   Cardiovascular: Normal rate, regular rhythm, normal heart sounds and intact distal pulses.    Pulmonary/Chest: Effort normal and breath sounds normal. No stridor. No respiratory distress. He has no wheezes.   Musculoskeletal: Normal range of motion. He exhibits tenderness. He exhibits no edema or deformity.   Left wrist is normal in appearance. No swelling, erythema, ecchymosis. There is mild tenderness to soft tissue of left distal wrist, medial aspect. Finkelstein negative. Strength 5/5. Full ROM but exhibits pain in all directions. Distal N/V intact. No bony tenderness.    Lymphadenopathy:     He has no cervical adenopathy.   Neurological: He is alert. He has normal strength. No cranial nerve deficit or sensory deficit. He exhibits normal muscle tone. Coordination and gait normal. GCS eye subscore is 4. GCS verbal subscore is 5. GCS motor subscore is 6.   Skin: Skin is warm, dry and intact. Capillary refill takes less than 2 seconds. No abrasion, no bruising, no ecchymosis, no laceration and no rash noted. He is not diaphoretic. No erythema. No pallor.   Psychiatric: He has a normal mood and affect. His behavior is normal. Judgment and thought content normal.   Vitals reviewed.         Assessment/Plan:     1. Left wrist tendonitis      OTC NSAIDS, velcro wrist splint, work restrictions, RICE, RTC in one week for  follow up  Supportive care, differential diagnoses, and indications for immediate follow-up discussed with patient.   Pathogenesis of diagnosis discussed including typical length and natural progression.   Instructed to return to clinic or nearest emergency department sooner for any change in condition, further concerns, or worsening of symptoms.  Patient states understanding of the plan of care and discharge instructions.        BARBARA Vazquez.

## 2018-08-02 ENCOUNTER — OCCUPATIONAL MEDICINE (OUTPATIENT)
Dept: URGENT CARE | Facility: CLINIC | Age: 53
End: 2018-08-02
Payer: COMMERCIAL

## 2018-08-02 VITALS
WEIGHT: 227 LBS | HEART RATE: 70 BPM | OXYGEN SATURATION: 96 % | HEIGHT: 71 IN | RESPIRATION RATE: 16 BRPM | SYSTOLIC BLOOD PRESSURE: 122 MMHG | DIASTOLIC BLOOD PRESSURE: 82 MMHG | TEMPERATURE: 97.5 F | BODY MASS INDEX: 31.78 KG/M2

## 2018-08-02 DIAGNOSIS — M77.8 LEFT WRIST TENDONITIS: ICD-10-CM

## 2018-08-02 PROCEDURE — 99213 OFFICE O/P EST LOW 20 MIN: CPT | Mod: 29 | Performed by: NURSE PRACTITIONER

## 2018-08-02 ASSESSMENT — PAIN SCALES - GENERAL: PAINLEVEL: NO PAIN

## 2018-08-02 NOTE — LETTER
"   [facility]  [address] - [place] [address]  Phone:  [phone] - Fax:  [phone]   [facility] Progress Report and Disability Certification  Date of Service: 8/2/2018   No Show:  No  Date / Time of Next Visit:     Claim Information   Patient Name: Fabio Bolaños  Claim Number:     Employer:   [employer] Date of Injury: 6/27/2018     Insurer / TPA: [employer]  ID / SSN:     Occupation:   Diagnosis: The encounter diagnosis was Left wrist tendonitis.    Medical Information   Related to Industrial Injury? 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 Shortly after starting this work, he noticed a \"twinge\" to his left wrist. He continued to finish his 9 hour shift, continuing the motions with the boxes. The pain continued to stay the same throughout the shift. He placed an ace wrap on his wrist for symptom relief the following day. He is right hand dominant. States he has been on vacation for several weeks since initial WC visit. States wrist pain has completely resolved. He feels completely improved. He did not take any medication for the pain. He does not have other jobs or contributing factors. Denies previous injuries to this wrist.    Objective Findings: Left wrist- Left wrist is normal in appearance. No swelling, erythema, ecchymosis. Non tender. Strength 5/5. FROM. Distal N/V intact.    Pre-Existing Condition(s):     Assessment:   Condition Improved    Status: Discharged /  MMI  Permanent Disability:No    Plan:      Diagnostics:      Comments:       Disability Information   Status: Released to Full Duty    From:     Through:   Restrictions are:     Physical Restrictions   Sitting:    Standing:    Stooping:    Bending:      Squatting:    Walking:    Climbing:    Pushing:      Pulling:    Other:    Reaching Above Shoulder (L):   Reaching " Above Shoulder (R):       Reaching Below Shoulder (L):    Reaching Below Shoulder (R):      Not to exceed Weight Limits   Carrying(hrs):   Weight Limit(lb):   Lifting(hrs):   Weight  Limit(lb):     Comments:      Repetitive Actions   Hands: i.e. Fine Manipulations from Grasping:     Feet: i.e. Operating Foot Controls:     Driving / Operate Machinery:     Physician Name: KAELA Barnett Physician Signature: BENNY Black e-Signature: Dr. Wilbur Bocanegra, Medical Director   Clinic Name / Location: [address]  [place] [address] Clinic Phone Number: Dept: [phone]   Appointment Time: 9:00 Am Visit Start Time: 9:14 AM   Check-In Time:  9:09 Am Visit Discharge Time: 9:32 AM    Original-Treating Physician or Chiropractor    Page 2-Insurer/TPA    Page 3-Employer    Page 4-Employee

## 2018-08-02 NOTE — PROGRESS NOTES
"Subjective:      Fabio Bolaños is a 52 y.o. 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 Shortly after starting this work, he noticed a \"twinge\" to his left wrist. He continued to finish his 9 hour shift, continuing the motions with the boxes. The pain continued to stay the same throughout the shift. He placed an ace wrap on his wrist for symptom relief the following day. He is right hand dominant. States he has been on vacation for several weeks since initial WC visit. States wrist pain has completely resolved. He feels completely improved. He did not take any medication for the pain. He does not have other jobs or contributing factors. Denies previous injuries to this wrist.      HPI    Review of Systems   Musculoskeletal: Negative for joint pain.   All other systems reviewed and are negative.    Past Medical History:   Diagnosis Date   • Dysmetabolic syndrome X 5/25/2011   • Hyperlipoproteinemia 5/25/2011   • Labile hypertension 5/25/2011   • Sleep apnea 5/25/2011    No past surgical history on file.   Social History     Social History   • Marital status:      Spouse name: N/A   • Number of children: N/A   • Years of education: N/A     Occupational History   • Not on file.     Social History Main Topics   • Smoking status: Never Smoker   • Smokeless tobacco: Never Used   • Alcohol use No   • Drug use: Unknown   • Sexual activity: Not on file     Other Topics Concern   • Not on file     Social History Narrative   • No narrative on file          Objective:     /82   Pulse 70   Temp 36.4 °C (97.5 °F)   Resp 16   Ht 1.803 m (5' 11\")   Wt 103 kg (227 lb)   SpO2 96%   BMI 31.66 kg/m²      Physical Exam   Constitutional: He is oriented to person, place, and time. Vital signs are normal. He appears well-developed and well-nourished.   HENT:   Head: Normocephalic and atraumatic.   Eyes: Pupils " are equal, round, and reactive to light. EOM are normal.   Neck: Normal range of motion.   Cardiovascular: Normal rate and regular rhythm.    Pulmonary/Chest: Effort normal.   Musculoskeletal: Normal range of motion.        Left wrist: He exhibits normal range of motion and no tenderness.   Neurological: He is alert and oriented to person, place, and time.   Skin: Skin is warm and dry. Capillary refill takes less than 2 seconds.   Psychiatric: He has a normal mood and affect. His speech is normal and behavior is normal. Thought content normal.   Vitals reviewed.      Left wrist- Left wrist is normal in appearance. No swelling, erythema, ecchymosis. Non tender. Strength 5/5. FROM. Distal N/V intact.        Assessment/Plan:     1. Left wrist tendonitis    Discharge MMI

## 2018-10-19 ENCOUNTER — TELEPHONE (OUTPATIENT)
Dept: CARDIOLOGY | Facility: MEDICAL CENTER | Age: 53
End: 2018-10-19

## 2018-10-19 NOTE — TELEPHONE ENCOUNTER
----- Message from Anca Mcfadden sent at 10/19/2018  8:23 AM PDT -----  Regarding: Patient needs lab order faxed to [facility]  IVETT/Vlad    Patient needs his lab order faxed to [facility] in [place], [place] and their fax number is [phone].

## 2018-10-23 ENCOUNTER — OFFICE VISIT (OUTPATIENT)
Dept: CARDIOLOGY | Facility: MEDICAL CENTER | Age: 53
End: 2018-10-23
Payer: COMMERCIAL

## 2018-10-23 VITALS
SYSTOLIC BLOOD PRESSURE: 98 MMHG | OXYGEN SATURATION: 95 % | HEART RATE: 66 BPM | WEIGHT: 229.28 LBS | BODY MASS INDEX: 32.1 KG/M2 | HEIGHT: 71 IN | DIASTOLIC BLOOD PRESSURE: 60 MMHG

## 2018-10-23 DIAGNOSIS — E88.810 DYSMETABOLIC SYNDROME X: Chronic | ICD-10-CM

## 2018-10-23 DIAGNOSIS — I10 ESSENTIAL HYPERTENSION: Primary | ICD-10-CM

## 2018-10-23 LAB
CHOLEST SERPL-MCNC: 246 MG/DL (ref 100–199)
HDLC SERPL-MCNC: 29 MG/DL
LABORATORY COMMENT REPORT: ABNORMAL
LDLC SERPL CALC-MCNC: 168 MG/DL (ref 0–99)
TRIGL SERPL-MCNC: 245 MG/DL (ref 0–149)
VLDLC SERPL CALC-MCNC: 49 MG/DL (ref 5–40)

## 2018-10-23 PROCEDURE — 99214 OFFICE O/P EST MOD 30 MIN: CPT | Performed by: INTERNAL MEDICINE

## 2018-10-23 ASSESSMENT — ENCOUNTER SYMPTOMS: CARDIOVASCULAR NEGATIVE: 1

## 2018-10-23 NOTE — LETTER
Name:          Fabio Bolaños   YOB: 1965  Date:     10/23/2018      John Temple M.D.  [address]  [place] [address]     Renny Katz MD  [address]  [place] [address]  Phone: [phone]  Back Line: [phone]  Fax: [phone]  E-mail: [email or web address]   Dear Dr. Temple,    We had the pleasure of seeing your patient, Fabio Bolaños, in Cardiology Clinic at [facility] today.    As you know, he is a 53-year-old man followed in cardiology clinic for dyslipidemia and essential hypertension in the context of previously diagnosed metabolic syndrome.    He is doing overall well today, and I reviewed with him his recent lipid panel showing us definite up trend in his LDL measured at 168 mg/dL.  I discussed that in conjunction with his coronary calcium score in total of 2.0.  Related to that, I did recommend lifestyle modification for treatment of his dyslipidemia and not statin therapy at this time.  I also informed him that I will plan to repeat a CT coronary calcium scan in 2022 to ensure that our strategy of lifestyle modification for treatment of his dyslipidemia is appropriate.     His blood pressure continues to be tightly controlled in our office at 98/60 mmHg, and I gave him some instructions for orthostasis should he have it with tight control of his blood pressure including crossed leg squeezes, and drinking a V8 juice if he does develop orthostasis.  I further instructed him that should his systolic blood pressure drop below 95 mmHg that he should altogether stop his antihypertensive therapy.  I encouraged him in ongoing lifestyle modification and weight loss.    Return in about 6 months (around 4/23/2019).    Thank you for the referral and please do not hesitate to contact me at any time. My contact information is listed above.    This note was dictated using Dragon speech recognition software.     A full note  including my physical examination and a full list of rectified medications is available in our medical record, and can be faxed as well.    Renny Katz MD  Cardiologist  [facility]

## 2018-10-24 NOTE — PROGRESS NOTES
Chief Complaint   Patient presents with   • Hypertension   • Hyperlipidemia       Subjective:   Fabio Bolaños is a 53 -year-old man followed in cardiology clinic for dyslipidemia and essential hypertension in the context of previously diagnosed metabolic syndrome.    He is doing well today, and has no cardiovascular complaint.  He reviews with me that he previously required several blood pressure medicines and is been surprised to find out that he no longer requires them.  His blood pressure at home has been previously well controlled as it is here in the office today despite progressive decreases in antihypertensive therapy.    He has not been very physically active in the aftermath of abdominal surgery with insufflation and some ongoing abdominal discomfort after that procedure several months ago.  With activities of daily living he has no new chest discomfort nor dyspnea.    Past Medical History:   Diagnosis Date   • Dysmetabolic syndrome X 5/25/2011   • Hyperlipoproteinemia 5/25/2011   • Labile hypertension 5/25/2011   • Sleep apnea 5/25/2011     History reviewed. No pertinent surgical history.  Family History   Problem Relation Age of Onset   • Heart Attack Father    • Stroke Mother      Social History     Social History   • Marital status:      Spouse name: N/A   • Number of children: N/A   • Years of education: N/A     Occupational History   • Not on file.     Social History Main Topics   • Smoking status: Never Smoker   • Smokeless tobacco: Never Used   • Alcohol use No   • Drug use: Unknown   • Sexual activity: Not on file     Other Topics Concern   • Not on file     Social History Narrative   • No narrative on file     No Known Allergies  Outpatient Encounter Prescriptions as of 10/23/2018   Medication Sig Dispense Refill   • olmesartan (BENICAR) 5 MG tablet Take 2 Tabs by mouth every day. BRAND NAME PER PATIENT (Patient taking differently: Take 2.5 mg by mouth every day. BRAND NAME PER  "PATIENT) 180 Tab 3   • [DISCONTINUED] choline fenofibrate (TRILIPIX) 135 MG CAPSULE DELAYED RELEASE      • [DISCONTINUED] aspirin EC (ECOTRIN) 81 MG TBEC Take 81 mg by mouth every day.       • [DISCONTINUED] celecoxib (CELEBREX) 200 MG CAPS Take 200 mg by mouth as needed.       No facility-administered encounter medications on file as of 10/23/2018.      Review of Systems   Cardiovascular: Negative.    All other systems reviewed and are negative.       Objective:   BP (!) 98/60 (BP Location: Right arm, Patient Position: Sitting, BP Cuff Size: Adult)   Pulse 66   Ht 1.803 m (5' 11\")   Wt 104 kg (229 lb 4.5 oz)   SpO2 95%   BMI 31.98 kg/m²     Physical Exam   Constitutional: He is oriented to person, place, and time. He appears well-developed and well-nourished. No distress.   Pleasant, well-dressed, middle-aged appearing man in noticed dress   Eyes: Pupils are equal, round, and reactive to light. EOM are normal.   Neck: No JVD present.   Cardiovascular: Normal rate and regular rhythm.  Exam reveals no gallop and no friction rub.    No murmur heard.  No carotid bruits   Pulmonary/Chest: Effort normal and breath sounds normal. No respiratory distress. He has no wheezes. He has no rales.   Abdominal: Soft. Bowel sounds are normal. He exhibits no distension.   Musculoskeletal: He exhibits no edema (No significant lower extremity edema bilaterally).   Neurological: He is alert and oriented to person, place, and time.   Skin: Skin is warm and dry. No rash noted. He is not diaphoretic. No erythema. No pallor.   Psychiatric: He has a normal mood and affect. Judgment and thought content normal.   Vitals reviewed.    No results found for: WBC, RBC, HEMOGLOBIN, HEMATOCRIT, MCV, MCH, MCHC, MPV     Lab Results   Component Value Date/Time    SODIUM 137 06/18/2018 10:18 AM    POTASSIUM 4.2 06/18/2018 10:18 AM    CHLORIDE 105 06/18/2018 10:18 AM    CO2 26 06/18/2018 10:18 AM    GLUCOSE 92 06/18/2018 10:18 AM    BUN 18 " "06/18/2018 10:18 AM    CREATININE 1.19 06/18/2018 10:18 AM    BUNCREATRAT 18 07/14/2016 02:55 PM        Lab Results   Component Value Date/Time    ASTSGOT 13 06/18/2018 10:18 AM    ALTSGPT 17 06/18/2018 10:18 AM        Lab Results   Component Value Date/Time    CHOLSTRLTOT 246 (H) 10/22/2018 08:19 AM    CHOLSTRLTOT 134 06/18/2018 10:18 AM     (H) 10/22/2018 08:19 AM    LDL 71 06/18/2018 10:18 AM    HDL 29 (L) 10/22/2018 08:19 AM    HDL 36 (A) 06/18/2018 10:18 AM    TRIGLYCERIDE 245 (H) 10/22/2018 08:19 AM    TRIGLYCERIDE 137 06/18/2018 10:18 AM         No results found for this or any previous visit.    CT coronary calcium scan, 8/1/2017:  \"Coronary calcification:  LMA - 0.0  LCX - 0.0  LAD - 2.0  RCA - 0.0  PDA - 0.0  Calcium score:  2.0\"    Assessment:     1. Essential hypertension     2. Dysmetabolic syndrome X     3. BMI 32.0-32.9,adult         Medical Decision Making:  Today's Assessment / Status / Plan:     He is doing overall well today, and I reviewed with him his recent lipid panel showing us definite up trend in his LDL measured at 168 mg/dL.  I discussed that in conjunction with his coronary calcium score in total of 2.0.  Related to that, I did recommend lifestyle modification for treatment of his dyslipidemia and not statin therapy at this time.  I also informed him that I will plan to repeat a CT coronary calcium scan in 2022 to ensure that our strategy of lifestyle modification for treatment of his dyslipidemia is appropriate.    His blood pressure continues to be tightly controlled in our office at 98/60 mmHg, and I gave him some instructions for orthostasis should he have it with tight control of his blood pressure including crossed leg squeezes, and drinking a V8 juice if he does develop orthostasis.  I further instructed him that should his systolic blood pressure drop below 95 mmHg that he should altogether stop his antihypertensive therapy.  I encouraged him in ongoing lifestyle " modification and weight loss.    Renny Katz MD  Cardiologist, [facility] [place]     Return in about 6 months (around 4/23/2019).

## 2019-04-22 ENCOUNTER — HOSPITAL ENCOUNTER (OUTPATIENT)
Dept: LAB | Facility: MEDICAL CENTER | Age: 54
End: 2019-04-22
Attending: INTERNAL MEDICINE
Payer: COMMERCIAL

## 2019-04-22 DIAGNOSIS — E78.5 HYPERLIPOPROTEINEMIA: ICD-10-CM

## 2019-04-22 LAB
ALBUMIN SERPL BCP-MCNC: 4.3 G/DL (ref 3.2–4.9)
ALBUMIN/GLOB SERPL: 1.5 G/DL
ALP SERPL-CCNC: 42 U/L (ref 30–99)
ALT SERPL-CCNC: 22 U/L (ref 2–50)
ANION GAP SERPL CALC-SCNC: 7 MMOL/L (ref 0–11.9)
AST SERPL-CCNC: 28 U/L (ref 12–45)
BILIRUB SERPL-MCNC: 0.7 MG/DL (ref 0.1–1.5)
BUN SERPL-MCNC: 18 MG/DL (ref 8–22)
CALCIUM SERPL-MCNC: 9.6 MG/DL (ref 8.5–10.5)
CHLORIDE SERPL-SCNC: 104 MMOL/L (ref 96–112)
CO2 SERPL-SCNC: 25 MMOL/L (ref 20–33)
CREAT SERPL-MCNC: 1.12 MG/DL (ref 0.5–1.4)
GLOBULIN SER CALC-MCNC: 2.9 G/DL (ref 1.9–3.5)
GLUCOSE SERPL-MCNC: 83 MG/DL (ref 65–99)
POTASSIUM SERPL-SCNC: 4.7 MMOL/L (ref 3.6–5.5)
PROT SERPL-MCNC: 7.2 G/DL (ref 6–8.2)
SODIUM SERPL-SCNC: 136 MMOL/L (ref 135–145)

## 2019-04-22 PROCEDURE — 80053 COMPREHEN METABOLIC PANEL: CPT

## 2019-04-22 PROCEDURE — 36415 COLL VENOUS BLD VENIPUNCTURE: CPT

## 2019-04-23 ENCOUNTER — OFFICE VISIT (OUTPATIENT)
Dept: CARDIOLOGY | Facility: MEDICAL CENTER | Age: 54
End: 2019-04-23
Payer: COMMERCIAL

## 2019-04-23 VITALS
BODY MASS INDEX: 32.41 KG/M2 | HEIGHT: 71 IN | SYSTOLIC BLOOD PRESSURE: 124 MMHG | WEIGHT: 231.48 LBS | OXYGEN SATURATION: 95 % | DIASTOLIC BLOOD PRESSURE: 82 MMHG | HEART RATE: 70 BPM

## 2019-04-23 DIAGNOSIS — E78.5 DYSLIPIDEMIA: ICD-10-CM

## 2019-04-23 DIAGNOSIS — I10 ESSENTIAL HYPERTENSION: ICD-10-CM

## 2019-04-23 DIAGNOSIS — R93.1 AGATSTON CAC SCORE, <100: Primary | ICD-10-CM

## 2019-04-23 PROCEDURE — 99214 OFFICE O/P EST MOD 30 MIN: CPT | Performed by: INTERNAL MEDICINE

## 2019-04-23 RX ORDER — LISINOPRIL 5 MG/1
5 TABLET ORAL DAILY
Qty: 90 TAB | Refills: 3 | Status: SHIPPED | OUTPATIENT
Start: 2019-04-23 | End: 2022-01-31

## 2019-04-23 RX ORDER — MINOXIDIL 2.5 MG/1
2.5 TABLET ORAL DAILY
COMMUNITY
End: 2019-04-23

## 2019-04-23 ASSESSMENT — ENCOUNTER SYMPTOMS: CARDIOVASCULAR NEGATIVE: 1

## 2019-04-23 NOTE — LETTER
Name:          Fabio Bolaños   YOB: 1965  Date:     04/23/2019      John Temple M.D.  [address]  [place] [address]     Renny Katz MD  [address]  [place] [address]  Phone: [phone]  Back Line: [phone]  Fax: [phone]  E-mail: [email or web address]   Dear Dr. Temple,    We had the pleasure of seeing your patient, Fabio Bolaños, in Cardiology Clinic at [facility] today.    As you know, he is a 53-year-old man followed in cardiology clinic for dyslipidemia and essential hypertension in the context of previously diagnosed metabolic syndrome.    He is doing quite well today with no cardiovascular complaints though he seems to have some side effects associated with his minoxidil related to which I changed him to lisinopril at 5 mg p.o. daily.  I reviewed with him his recent labs and I did order a repeat nonfasting chemistry panel after initiation of lisinopril in addition to a nonfasting lipid panel.  I again discussed with him that I would not restart lipid medications and less his LDL was greater than 190 mg/dL given his minimal coronary calcification (Agatston score of 2.0).  Again, with diet and exercise and will plan to repeat a coronary calcium scan in probably 2022.    Return in about 6 months (around 10/23/2019).    Thank you for the referral and please do not hesitate to contact me at any time. My contact information is listed above.    This note was dictated using Dragon speech recognition software.     A full note including my physical examination and a full list of rectified medications is available in our medical record, and can be faxed as well.    Rneny Katz MD  Cardiologist  [facility]

## 2019-04-23 NOTE — PROGRESS NOTES
No chief complaint on file.      Subjective:   Fabio Bolaños is a 53 -year-old man followed in cardiology clinic for dyslipidemia and essential hypertension in the context of previously diagnosed metabolic syndrome.    He is doing overall well today, and has no cardiovascular complaints though he does tell me about a bit of decreased libido after he was apparently essentially forced by his insurance company to switch from previously Benicar to minoxidil.    His blood pressures have been well controlled at home, and he comes in to review the labs that he had done just recently though those unfortunately did not include a lipid panel.    Past Medical History:   Diagnosis Date   • Dysmetabolic syndrome X 5/25/2011   • Hyperlipoproteinemia 5/25/2011   • Labile hypertension 5/25/2011   • Sleep apnea 5/25/2011     History reviewed. No pertinent surgical history.  Family History   Problem Relation Age of Onset   • Heart Attack Father    • Stroke Mother      Social History     Social History   • Marital status:      Spouse name: N/A   • Number of children: N/A   • Years of education: N/A     Occupational History   • Not on file.     Social History Main Topics   • Smoking status: Never Smoker   • Smokeless tobacco: Never Used   • Alcohol use No   • Drug use: Unknown   • Sexual activity: Not on file     Other Topics Concern   • Not on file     Social History Narrative   • No narrative on file     No Known Allergies  Outpatient Encounter Prescriptions as of 4/23/2019   Medication Sig Dispense Refill   • lisinopril (PRINIVIL) 5 MG Tab Take 1 Tab by mouth every day. 90 Tab 3   • [DISCONTINUED] minoxidil (LONITEN) 2.5 MG Tab Take 2.5 mg by mouth every day.     • [DISCONTINUED] olmesartan (BENICAR) 5 MG tablet Take 2 Tabs by mouth every day. BRAND NAME PER PATIENT (Patient not taking: Reported on 4/23/2019) 180 Tab 3     No facility-administered encounter medications on file as of 4/23/2019.      Review of Systems  "  Cardiovascular: Negative.    All other systems reviewed and are negative.       Objective:   /82   Pulse 70   Ht 1.803 m (5' 11\")   Wt 105 kg (231 lb 7.7 oz)   SpO2 95%   BMI 32.29 kg/m²     Physical Exam   Constitutional: He is oriented to person, place, and time. He appears well-developed and well-nourished. No distress.   Pleasant, well-dressed, middle-aged appearing man in noticed dress.  Physical examination is unchanged except where specified compared to my previous on 10/23/2018.   Eyes: Pupils are equal, round, and reactive to light. EOM are normal.   Neck: No JVD present.   Cardiovascular: Normal rate and regular rhythm.  Exam reveals no gallop and no friction rub.    No murmur heard.  No carotid bruits   Pulmonary/Chest: Effort normal and breath sounds normal. No respiratory distress. He has no wheezes. He has no rales.   Abdominal: Soft. Bowel sounds are normal. He exhibits no distension.   Musculoskeletal: He exhibits no edema (No significant lower extremity edema bilaterally).   Neurological: He is alert and oriented to person, place, and time.   Skin: Skin is warm and dry. No rash noted. He is not diaphoretic. No erythema. No pallor.   Psychiatric: He has a normal mood and affect. Judgment and thought content normal.   Nursing note and vitals reviewed.    No results found for: WBC, RBC, HEMOGLOBIN, HEMATOCRIT, MCV, MCH, MCHC, MPV     Lab Results   Component Value Date/Time    SODIUM 136 04/22/2019 09:03 AM    POTASSIUM 4.7 04/22/2019 09:03 AM    CHLORIDE 104 04/22/2019 09:03 AM    CO2 25 04/22/2019 09:03 AM    GLUCOSE 83 04/22/2019 09:03 AM    BUN 18 04/22/2019 09:03 AM    CREATININE 1.12 04/22/2019 09:03 AM    BUNCREATRAT 18 07/14/2016 02:55 PM        Lab Results   Component Value Date/Time    ASTSGOT 28 04/22/2019 09:03 AM    ALTSGPT 22 04/22/2019 09:03 AM        Lab Results   Component Value Date/Time    CHOLSTRLTOT 246 (H) 10/22/2018 08:19 AM    CHOLSTRLTOT 134 06/18/2018 10:18 AM    " " (H) 10/22/2018 08:19 AM    LDL 71 06/18/2018 10:18 AM    HDL 29 (L) 10/22/2018 08:19 AM    HDL 36 (A) 06/18/2018 10:18 AM    TRIGLYCERIDE 245 (H) 10/22/2018 08:19 AM    TRIGLYCERIDE 137 06/18/2018 10:18 AM         No results found for this or any previous visit.    CT coronary calcium scan, 8/1/2017:  \"Coronary calcification:  LMA - 0.0  LCX - 0.0  LAD - 2.0  RCA - 0.0  PDA - 0.0  Calcium score:  2.0\"    Assessment:     1. Agatston CAC score, <100     2. Dyslipidemia  Basic Metabolic Panel    Lipid Profile   3. Essential hypertension  lisinopril (PRINIVIL) 5 MG Tab    Basic Metabolic Panel       Medical Decision Making:  Today's Assessment / Status / Plan:     He is doing quite well today with no cardiovascular complaints though he seems to have some side effects associated with his minoxidil related to which I changed him to lisinopril at 5 mg p.o. daily.  I reviewed with him his recent labs and I did order a repeat nonfasting chemistry panel after initiation of lisinopril in addition to a nonfasting lipid panel.  I again discussed with him that I would not restart lipid medications and less his LDL was greater than 190 mg/dL given his minimal coronary calcification (Agatston score of 2.0).  Again, with diet and exercise and will plan to repeat a coronary calcium scan in probably 2022.    Renny Katz MD  Cardiologist, [facility] [place]     Return in about 6 months (around 10/23/2019).    "

## 2021-03-15 DIAGNOSIS — Z23 NEED FOR VACCINATION: ICD-10-CM

## 2021-04-03 ENCOUNTER — IMMUNIZATION (OUTPATIENT)
Dept: FAMILY PLANNING/WOMEN'S HEALTH CLINIC | Facility: IMMUNIZATION CENTER | Age: 56
End: 2021-04-03
Attending: INTERNAL MEDICINE
Payer: COMMERCIAL

## 2021-04-03 DIAGNOSIS — Z23 NEED FOR VACCINATION: ICD-10-CM

## 2021-04-03 DIAGNOSIS — Z23 ENCOUNTER FOR VACCINATION: Primary | ICD-10-CM

## 2021-04-03 PROCEDURE — 0001A PFIZER SARS-COV-2 VACCINE: CPT

## 2021-04-03 PROCEDURE — 91300 PFIZER SARS-COV-2 VACCINE: CPT

## 2021-04-24 ENCOUNTER — IMMUNIZATION (OUTPATIENT)
Dept: FAMILY PLANNING/WOMEN'S HEALTH CLINIC | Facility: IMMUNIZATION CENTER | Age: 56
End: 2021-04-24
Attending: INTERNAL MEDICINE
Payer: COMMERCIAL

## 2021-04-24 DIAGNOSIS — Z23 ENCOUNTER FOR VACCINATION: Primary | ICD-10-CM

## 2021-04-24 PROCEDURE — 91300 PFIZER SARS-COV-2 VACCINE: CPT

## 2021-04-24 PROCEDURE — 0002A PFIZER SARS-COV-2 VACCINE: CPT

## 2021-09-17 ENCOUNTER — TELEPHONE (OUTPATIENT)
Dept: SCHEDULING | Facility: IMAGING CENTER | Age: 56
End: 2021-09-17

## 2021-10-04 ENCOUNTER — HOSPITAL ENCOUNTER (OUTPATIENT)
Dept: RADIOLOGY | Facility: MEDICAL CENTER | Age: 56
End: 2021-10-04
Attending: FAMILY MEDICINE
Payer: COMMERCIAL

## 2021-10-04 DIAGNOSIS — M25.561 RIGHT KNEE PAIN, UNSPECIFIED CHRONICITY: ICD-10-CM

## 2021-10-04 PROCEDURE — 73560 X-RAY EXAM OF KNEE 1 OR 2: CPT | Mod: RT

## 2021-10-11 ENCOUNTER — TELEPHONE (OUTPATIENT)
Dept: MEDICAL GROUP | Facility: OTHER | Age: 56
End: 2021-10-11

## 2021-10-11 DIAGNOSIS — N40.1 BENIGN PROSTATIC HYPERPLASIA WITH LOWER URINARY TRACT SYMPTOMS, SYMPTOM DETAILS UNSPECIFIED: ICD-10-CM

## 2021-10-11 RX ORDER — TAMSULOSIN HYDROCHLORIDE 0.4 MG/1
0.4 CAPSULE ORAL
Qty: 90 CAPSULE | Refills: 3 | Status: SHIPPED | OUTPATIENT
Start: 2021-10-11 | End: 2021-10-12 | Stop reason: SDUPTHER

## 2021-10-11 NOTE — TELEPHONE ENCOUNTER
PT WAS IN ;AST WEEK. HE NEEDS A REFILL OF TAMSULOSIN SENT TO HIS PHARMACY [facility] [address] [place]. PLEASE CALL PT WHEN DONE [phone]

## 2021-10-12 RX ORDER — TAMSULOSIN HYDROCHLORIDE 0.4 MG/1
0.4 CAPSULE ORAL
Qty: 90 CAPSULE | Refills: 3 | Status: SHIPPED | OUTPATIENT
Start: 2021-10-12 | End: 2022-11-08

## 2021-10-27 NOTE — TELEPHONE ENCOUNTER
Last seen: 09/27/21 by Dr. Temple  Next appt: None     Was the patient seen in the last year in this department? Yes   Does patient have an active prescription for medications requested? No   Received Request Via: Pharmacy

## 2021-10-28 RX ORDER — OLMESARTAN MEDOXOMIL 5 MG/1
5 TABLET ORAL DAILY
Qty: 90 TABLET | Refills: 1 | Status: SHIPPED | OUTPATIENT
Start: 2021-10-28 | End: 2022-06-16 | Stop reason: SDUPTHER

## 2022-01-24 ENCOUNTER — APPOINTMENT (OUTPATIENT)
Dept: MEDICAL GROUP | Facility: OTHER | Age: 57
End: 2022-01-24
Payer: COMMERCIAL

## 2022-01-31 ENCOUNTER — OFFICE VISIT (OUTPATIENT)
Dept: MEDICAL GROUP | Facility: OTHER | Age: 57
End: 2022-01-31
Payer: COMMERCIAL

## 2022-01-31 ENCOUNTER — TELEPHONE (OUTPATIENT)
Dept: MEDICAL GROUP | Facility: OTHER | Age: 57
End: 2022-01-31

## 2022-01-31 VITALS
OXYGEN SATURATION: 96 % | HEIGHT: 71 IN | RESPIRATION RATE: 14 BRPM | TEMPERATURE: 97.4 F | HEART RATE: 82 BPM | SYSTOLIC BLOOD PRESSURE: 140 MMHG | WEIGHT: 245 LBS | DIASTOLIC BLOOD PRESSURE: 70 MMHG | BODY MASS INDEX: 34.3 KG/M2

## 2022-01-31 DIAGNOSIS — G47.33 OBSTRUCTIVE SLEEP APNEA SYNDROME: ICD-10-CM

## 2022-01-31 DIAGNOSIS — E88.810 DYSMETABOLIC SYNDROME X: ICD-10-CM

## 2022-01-31 DIAGNOSIS — G47.39 OTHER SLEEP APNEA: ICD-10-CM

## 2022-01-31 DIAGNOSIS — E78.2 MIXED HYPERLIPIDEMIA: ICD-10-CM

## 2022-01-31 DIAGNOSIS — I10 PRIMARY HYPERTENSION: ICD-10-CM

## 2022-01-31 PROBLEM — N40.0 BENIGN PROSTATIC HYPERPLASIA: Status: ACTIVE | Noted: 2021-04-07

## 2022-01-31 PROBLEM — D17.9 LIPOMA: Status: ACTIVE | Noted: 2021-04-07

## 2022-01-31 PROBLEM — Z00.00 ENCOUNTER FOR GENERAL ADULT MEDICAL EXAMINATION WITHOUT ABNORMAL FINDINGS: Status: ACTIVE | Noted: 2020-03-03

## 2022-01-31 PROCEDURE — 99214 OFFICE O/P EST MOD 30 MIN: CPT | Performed by: FAMILY MEDICINE

## 2022-01-31 ASSESSMENT — ENCOUNTER SYMPTOMS
CONSTITUTIONAL NEGATIVE: 1
RESPIRATORY NEGATIVE: 1
CARDIOVASCULAR NEGATIVE: 1

## 2022-01-31 NOTE — PROGRESS NOTES
Subjective:   Fabio Bolaños is a 56 y.o. male here for the evaluation and management of Follow-Up (CPAP ORDER )    Hyperlipidemia-laboratory studies recommended    Hypertension-borderline today, he reports home blood pressures have been slightly elevated, recommended ongoing ambulatory monitoring and treatment of obstructive sleep apnea    Obstructive sleep apnea-has been stable for some time but had a failure of his CPAP machine and has been borrowing a friend's.  He requests another order for a CPAP.    BPH-much improved with use of Flomax  Problem   Benign Prostatic Hyperplasia   Lipoma   Encounter for General Adult Medical Examination Without Abnormal Findings   Hypertension   Hyperlipidemia       Review of Systems   Constitutional: Negative.    Respiratory: Negative.    Cardiovascular: Negative.        Current Outpatient Medications   Medication Sig Dispense Refill   • olmesartan (BENICAR) 5 MG tablet Take 1 Tablet by mouth every day. 90 Tablet 1   • tamsulosin (FLOMAX) 0.4 MG capsule Take 1 Capsule by mouth 1/2 hour after breakfast. 90 Capsule 3     No current facility-administered medications for this visit.     Allergies  Patient has no known allergies.    Past Medical History:   Diagnosis Date   • Dysmetabolic syndrome X 5/25/2011   • Hyperlipoproteinemia 5/25/2011   • Labile hypertension 5/25/2011   • Sleep apnea 5/25/2011     Patient Active Problem List    Diagnosis Date Noted   • Benign prostatic hyperplasia 04/07/2021   • Lipoma 04/07/2021   • Encounter for general adult medical examination without abnormal findings 03/03/2020   • Agatston CAC score, <100 04/23/2019   • BMI 32.0-32.9,adult 10/23/2018   • Hypertension 03/09/2015   • Dysmetabolic syndrome X 05/25/2011   • Hyperlipidemia 05/25/2011   • Sleep apnea 05/25/2011       Past Surgical History  History reviewed. No pertinent surgical history.     Objective:     Vitals:    01/31/22 1139   BP: 140/70   BP Location: Left arm   Patient  "Position: Sitting   BP Cuff Size: Adult   Pulse: 82   Resp: 14   Temp: 36.3 °C (97.4 °F)   TempSrc: Temporal   SpO2: 96%   Weight: 111 kg (245 lb)   Height: 1.803 m (5' 11\")     Body mass index is 34.17 kg/m².     Physical Exam  Constitutional:       Appearance: Normal appearance.   HENT:      Head: Normocephalic.   Eyes:      Extraocular Movements: Extraocular movements intact.      Conjunctiva/sclera: Conjunctivae normal.   Cardiovascular:      Rate and Rhythm: Normal rate and regular rhythm.      Heart sounds: Normal heart sounds.   Pulmonary:      Effort: Pulmonary effort is normal.      Breath sounds: Normal breath sounds.   Skin:     General: Skin is warm and dry.   Neurological:      Mental Status: He is alert and oriented to person, place, and time. Mental status is at baseline.   Psychiatric:         Mood and Affect: Mood normal.         Behavior: Behavior normal.         Assessment and Plan:   Fabio Bolaños is a 56 y.o. male with a Follow-Up (CPAP ORDER )     The following was discussed with the patient today.    Problem List Items Addressed This Visit     Dysmetabolic syndrome X (Chronic)    Relevant Orders    CBC WITHOUT DIFFERENTIAL    Comp Metabolic Panel    Lipid Profile    HEMOGLOBIN A1C    Sleep apnea (Chronic)    Relevant Orders    DME CPAP    Hyperlipidemia    Relevant Orders    CBC WITHOUT DIFFERENTIAL    Comp Metabolic Panel    Lipid Profile        Is reviewed, reviewed and discussed previous laboratory studies including coronary calcium test, recommended updating laboratory studies, CPAP order provided and faxed, recommended laboratory studies and close follow-up to review along with ambulatory blood pressure monitoring    Followup: No follow-ups on file.    John Temple M.D.    Please note that this dictation was created using voice recognition software. I have made every reasonable attempt to correct obvious errors, but I expect that there are errors of grammar and possibly content " that I did not discover before finalizing the note.

## 2022-02-01 ENCOUNTER — TELEPHONE (OUTPATIENT)
Dept: MEDICAL GROUP | Facility: OTHER | Age: 57
End: 2022-02-01
Payer: COMMERCIAL

## 2022-02-02 NOTE — TELEPHONE ENCOUNTER
Caller Name: Rene  Call Back Number: [phone]    How would the patient prefer to be contacted with a response: Phone call OK to leave a detailed message    Patient called and is requesting a new sleep study before he gets a new Cpap as his old sleep results has been more then 15 yrs. Old needs to go att: Usha Ghosh fax # [phone].

## 2022-02-09 ENCOUNTER — TELEPHONE (OUTPATIENT)
Dept: MEDICAL GROUP | Facility: OTHER | Age: 57
End: 2022-02-09

## 2022-02-10 ENCOUNTER — TELEPHONE (OUTPATIENT)
Dept: MEDICAL GROUP | Facility: OTHER | Age: 57
End: 2022-02-10

## 2022-02-10 DIAGNOSIS — G47.33 OBSTRUCTIVE SLEEP APNEA SYNDROME: ICD-10-CM

## 2022-02-10 NOTE — TELEPHONE ENCOUNTER
As you know, I no longer have a working CPAP machine. Allymiladis tells me that that the prescription that you sent was only for CPAP supplies but not the machine.    If you determine that Allymiladis’s story is true, may I ask for a durable medical equipment CPAP Rx be faxed to:    [phone]    Thank you.    Rene Bolaños 1965  [phone]

## 2022-02-16 ENCOUNTER — APPOINTMENT (OUTPATIENT)
Dept: MEDICAL GROUP | Facility: OTHER | Age: 57
End: 2022-02-16
Payer: COMMERCIAL

## 2022-02-23 ENCOUNTER — APPOINTMENT (OUTPATIENT)
Dept: MEDICAL GROUP | Facility: OTHER | Age: 57
End: 2022-02-23
Payer: COMMERCIAL

## 2022-03-24 ENCOUNTER — APPOINTMENT (OUTPATIENT)
Dept: MEDICAL GROUP | Facility: OTHER | Age: 57
End: 2022-03-24
Payer: COMMERCIAL

## 2022-04-01 ENCOUNTER — APPOINTMENT (OUTPATIENT)
Dept: MEDICAL GROUP | Facility: OTHER | Age: 57
End: 2022-04-01
Payer: COMMERCIAL

## 2022-06-16 ENCOUNTER — TELEPHONE (OUTPATIENT)
Dept: MEDICAL GROUP | Facility: OTHER | Age: 57
End: 2022-06-16
Payer: COMMERCIAL

## 2022-06-16 RX ORDER — OLMESARTAN MEDOXOMIL 5 MG/1
5 TABLET ORAL DAILY
Qty: 90 TABLET | Refills: 3 | Status: SHIPPED | OUTPATIENT
Start: 2022-06-16 | End: 2022-08-31

## 2022-06-16 NOTE — TELEPHONE ENCOUNTER
Pt called he needs a refill of Benicar his pharmacy is [facility]andro and Erik parmar

## 2022-08-31 ENCOUNTER — OFFICE VISIT (OUTPATIENT)
Dept: MEDICAL GROUP | Facility: LAB | Age: 57
End: 2022-08-31
Payer: COMMERCIAL

## 2022-08-31 VITALS
WEIGHT: 239.8 LBS | HEIGHT: 71 IN | BODY MASS INDEX: 33.57 KG/M2 | RESPIRATION RATE: 14 BRPM | TEMPERATURE: 96.9 F | OXYGEN SATURATION: 96 % | DIASTOLIC BLOOD PRESSURE: 82 MMHG | SYSTOLIC BLOOD PRESSURE: 136 MMHG | HEART RATE: 60 BPM

## 2022-08-31 DIAGNOSIS — E78.2 MIXED HYPERLIPIDEMIA: ICD-10-CM

## 2022-08-31 DIAGNOSIS — I10 PRIMARY HYPERTENSION: ICD-10-CM

## 2022-08-31 DIAGNOSIS — Z13.1 SCREENING FOR DIABETES MELLITUS: ICD-10-CM

## 2022-08-31 PROBLEM — Z00.00 ENCOUNTER FOR GENERAL ADULT MEDICAL EXAMINATION WITHOUT ABNORMAL FINDINGS: Status: RESOLVED | Noted: 2020-03-03 | Resolved: 2022-08-31

## 2022-08-31 PROCEDURE — 99204 OFFICE O/P NEW MOD 45 MIN: CPT | Performed by: FAMILY MEDICINE

## 2022-08-31 RX ORDER — OLMESARTAN MEDOXOMIL 5 MG/1
5 TABLET ORAL DAILY
Qty: 90 TABLET | Refills: 3
Start: 2022-08-31 | End: 2023-02-22 | Stop reason: SDUPTHER

## 2022-08-31 RX ORDER — MELOXICAM 15 MG/1
15 TABLET ORAL
COMMUNITY
Start: 2022-07-12 | End: 2022-12-07

## 2022-08-31 ASSESSMENT — PATIENT HEALTH QUESTIONNAIRE - PHQ9: CLINICAL INTERPRETATION OF PHQ2 SCORE: 0

## 2022-08-31 NOTE — LETTER
[email or web address]  Brian Souza M.D.  [address]  [place] [address]  Fax: [phone]   Authorization for Release/Disclosure of   Protected Health Information   Name: FABIO BOLAÑOS : 1965 SSN: [id number]   Address: [address] [place] [address]  [place] [address] Phone:    [phone] (home) [phone] (work)   I authorize the entity listed below to release/disclose the PHI below to:   [facility]/Brian Souza M.D. and Brian Souza M.D.   Provider or Entity Name:  [facility]    Address   City, State, [facility]   Phone:      Fax:     Reason for request: continuity of care   Information to be released:    [XXXXX] LAST COLONOSCOPY,  including any PATH REPORT and follow-up  [  ] LAST FIT/COLOGUARD RESULT [  ] LAST DEXA  [  ] LAST MAMMOGRAM  [  ] LAST PAP  [  ] LAST LABS [  ] RETINA EXAM REPORT  [  ] IMMUNIZATION RECORDS  [XXXXX] Release all info      DATES OF SERVICE OR TIME PERIOD TO BE DISCLOSED: _____________  I understand and acknowledge that:  * This Authorization may be revoked at any time by you in writing, except if your health information has already been used or disclosed.  * Your health information that will be used or disclosed as a result of you signing this authorization could be re-disclosed by the recipient. If this occurs, your re-disclosed health information may no longer be protected by State or Federal laws.  * You may refuse to sign this Authorization. Your refusal will not affect your ability to obtain treatment.  * This Authorization becomes effective upon signing and will  on (date) __________.      If no date is indicated, this Authorization will  one (1) year from the signature date.    Name: Fabio Bolaños    Signature:   Date:     2022       PLEASE FAX REQUESTED RECORDS BACK TO: [phone]

## 2022-08-31 NOTE — PROGRESS NOTES
"Fabio Bolaños is a 56 y.o. male here to establish care and discuss chronic conditions.    Has been maintained on Benicar for blood pressure, does report this may have been elevated at other visits or dental visits recently.  Only other regular medication is Flomax.  Had been on statin but had CAC score of 2.0 in 2017 and this was discontinued, had been following with cardiology at the time.    Current medicines (including changes today)  Current Outpatient Medications   Medication Sig Dispense Refill    meloxicam (MOBIC) 15 MG tablet Take 15 mg by mouth every day.      tamsulosin (FLOMAX) 0.4 MG capsule Take 1 Capsule by mouth 1/2 hour after breakfast. 90 Capsule 3     No current facility-administered medications for this visit.     He  has a past medical history of Dysmetabolic syndrome X (5/25/2011), Hyperlipoproteinemia (5/25/2011), Labile hypertension (5/25/2011), and Sleep apnea (5/25/2011).  He  has no past surgical history on file.  Social History     Tobacco Use    Smoking status: Never    Smokeless tobacco: Never   Vaping Use    Vaping Use: Never used   Substance Use Topics    Alcohol use: No     Social History     Social History Narrative    Not on file     Family History   Problem Relation Age of Onset    Heart Attack Father     Stroke Mother      Family Status   Relation Name Status    Fa  Other        REPORTED FAMILY HISTORY OF EARLY DEATHS.    Mo  (Not Specified)       ROS  See HPI     Objective:     Physical Exam:  /82 (BP Location: Right arm, Patient Position: Sitting, BP Cuff Size: Large adult)   Pulse 60   Temp 36.1 °C (96.9 °F)   Resp 14   Ht 1.803 m (5' 11\")   Wt 109 kg (239 lb 12.8 oz)   SpO2 96%  Body mass index is 33.45 kg/m².  Constitutional: Alert. Well appearing. No distress.  Skin: Warm, dry, good turgor, no visible rashes.  Eye: Equal, round and reactive to light, conjunctiva clear, lids normal.  Neck: Trachea midline, no masses, no thyromegaly.   Respiratory: Normal " effort. Lungs are clear to auscultation bilaterally.  Cardiovascular: Regular rate and rhythm. Normal S1/S2. No murmurs, rubs or gallops.   Neuro: Moves all four extremities. No facial droop.  Psych: Answers questions appropriately. Normal affect and mood.      Assessment and Plan:     1. Primary hypertension  At goal today but reports borderline control at other visits.  Continue Benicar for now.  Follow-up in 3 months for recheck.  - CT-HEART W/O CONT EVAL CALCIUM; Future    2. Mixed hyperlipidemia  Statin was stopped due to CAC score 2.0 in 2017.  Rechecking labs and CAC score as below.  - CBC WITH DIFFERENTIAL; Future  - Comp Metabolic Panel; Future  - Lipid Profile; Future  - CT-HEART W/O CONT EVAL CALCIUM; Future    3. Screening for diabetes mellitus  - HEMOGLOBIN A1C; Future      Records requested from previous PCP.  Follow up: No follow-ups on file.         PLEASE NOTE: This note was created using voice recognition software.

## 2022-09-07 ENCOUNTER — HOSPITAL ENCOUNTER (OUTPATIENT)
Dept: RADIOLOGY | Facility: MEDICAL CENTER | Age: 57
End: 2022-09-07
Attending: STUDENT IN AN ORGANIZED HEALTH CARE EDUCATION/TRAINING PROGRAM
Payer: COMMERCIAL

## 2022-09-07 DIAGNOSIS — R13.14 DYSPHAGIA, PHARYNGOESOPHAGEAL PHASE: ICD-10-CM

## 2022-09-07 DIAGNOSIS — R13.19 ESOPHAGEAL DYSPHAGIA: ICD-10-CM

## 2022-09-07 PROCEDURE — 92611 MOTION FLUOROSCOPY/SWALLOW: CPT

## 2022-09-07 PROCEDURE — 74230 X-RAY XM SWLNG FUNCJ C+: CPT

## 2022-09-07 NOTE — OP THERAPY EVALUATION
"[facility]  Speech-Language Pathology Department  Modified Barium Swallow Study Summary      Patient: Fabio Bolaños     Date of Evaluation: 9/7/22    Referring Provider:  Marjorie Curry MD  Fax: [phone]       Patient History/Reason for Referral: Pt was referred for MBS due to dysphagia, pharyngoesophageal phase.    Patient c/o choking on liquids and solids for the last couple of years.  No deficits with pills. No regurgitation.  Noted that at least 5 times a week solids will stick in chest area and require \"a couple chugs of water\" to clear stasis from this area which takes about 4-5 seconds.  Experiencing odynophagia during this time at a level of 8-9/10 and will resolve once cleared.  Liquids will feel as though \"going the wring way,\" but this is inconsistent and he is not noting patterns to varied temperatures.  May have worsening symptoms of dysphagia with all viscosities in evening times when more fatigued. No avoidance of viscosities.  No weight loss. Voice is baseline hoarse, saw MD in elementary years to address significant hoarseness.  Drinks 36+ ounces of non caffeinated fluids.     7/22/22 had flexible endoscopy completed which resulted with \"mild true vocal folds with erythema.\"  ENT offered for patient to start PPI, but he wanted to wait till this test was completed.  ENT concerned about diverticulum versus a stricture.   Hasn't seen GI since colonoscopy when he was 48.  [facility].    Past medical history not limited to: Dysmetabolic syndrome X, HTN, sleep apnea, dysphonia    Current Method of Nutrition   Oral diet (regular, thin)    Pertinent Information  Affect/Behavior: Appropriate, Calm  Oxygen Requirements: Room Air  Secretion Management: WNL  -Dentition: Good  -Labial: WFL  -Lingual: WFL  -Palatal Elevation: WFL  -Laryngeal Elevation: adequate  -Vocal Quality: slight hoarseness, ample intensity  - Circumlaryngeal Palpation: no pain or " edema  -Factor(s) Affecting Performance: None    Assessment  Videofluoroscopic Swallow Study was conducted in the lateral and AP projection(s) to evaluate oropharyngeal swallow function. A radiology tech was present to assist with the procedure.     Positioning: seated upright in fluoroscopy chair, standing (AP view)  Anatomic View: WNL  Bolus Administration: Patient  PO barium contrast trials: Varibar thin liquid, Varibar nectar (mildly thick) liquid, Varibar pudding, solid coated in Varibar pudding, mixed consistency with thin Varibar and ground solid      Consistency PAS Score Timing Comments   Thin Liquid 1 N/A    Mildly Thick Liquid 1 N/A    Pudding 1 N/A    Solid 1 N/A    Mixed 1 N/A        1     No contrast enters airway  2     Contrast enters the airway, remains above the vocal folds, and is ejected from the airway (not seen in the airway at the end of the swallow).  3     Contrast enters the airway, remains above the vocal folds, and is not ejected from the airway (is seen in the airway after the swallow).  4     Contrast enters the airway, contacts the vocal folds, and is ejected from the airway.  5     Contrast enters the airway, contacts the vocal folds, and is not ejected from the airway  6     Contrast enters the airway, crosses the plane of the vocal folds, and is ejected from the airway.  7     Contrast enters the airway, crosses the plane of the vocal folds, and is not ejected from the airway despite effort.  8     Contrast enters the airway, crosses the plane of the vocal folds, is not ejected from the airway and there is no response to aspiration.      Oral phase:    Patient with mild oral residue with intake of all viscosities with large quantity only.   Independently cleared with secondary swallow.  Premature spillage to valleculae with thins on large quantity only.     Pharyngeal phase:    Mild reduced anterior hyoid lift, otherwise unremarkable.      Esophageal phase:    Noted narrowing mid  esophagus that would cause mild rentention and retrograde flow to occur with all viscosities.  Small intake and alternating liquids and solids reduced/eliminated stasis.      Compensatory Strategies:    Small intake, alternating liquids and solids, secondary swallow.      Clinical Impressions  The pt presents with a functional oropharyngeal swallow, likely reported deficits are chronic related to esophageal dysphagia.   Swallow safety is preserved; swallow efficiency is preserved. Risk for aspiration PNA is low. Risk for malnutrition/dehydration is low. A modified diet is not indicated at this time. Swallow prognosis is excellent given adequate airway protection and strength. Would suspect instances of ascending events occurring intermittently if strategies are not in place.  No diverticulum appreciated when scanned AP and suspect an esophageal stricture as there was narrowing mid esophagus.  Further assessment from GI would be beneficial along with further testing as indicated.      Recommendations  Regular (IDDSI 7), thin (IDDSI 0)  2.  Swallowing Instructions & Precautions:   Supervision: Independent  Positioning: Fully upright and midline during oral intake, Remain upright for 30 minutes after oral intake  Medication: Whole with liquid  Strategies: Small bites/sips, Alternate bites and sips, Slow rate of intake, avoid problematic foods, and no talking.        Your patient may benefit from a referral for:       Consider further assessment from GI for esophageal dysfunction.      Thanks you for the referral.    For further questions, please call [phone].       Jovanna Oswald M.S., CCC-SLP

## 2022-10-12 ENCOUNTER — HOSPITAL ENCOUNTER (OUTPATIENT)
Dept: RADIOLOGY | Facility: MEDICAL CENTER | Age: 57
End: 2022-10-12
Attending: FAMILY MEDICINE
Payer: COMMERCIAL

## 2022-10-12 DIAGNOSIS — E78.2 MIXED HYPERLIPIDEMIA: ICD-10-CM

## 2022-10-12 DIAGNOSIS — I10 PRIMARY HYPERTENSION: ICD-10-CM

## 2022-10-12 PROCEDURE — 4410556 CT-CARDIAC SCORING (SELF PAY ONLY)

## 2022-11-08 DIAGNOSIS — N40.1 BENIGN PROSTATIC HYPERPLASIA WITH LOWER URINARY TRACT SYMPTOMS, SYMPTOM DETAILS UNSPECIFIED: ICD-10-CM

## 2022-11-08 RX ORDER — TAMSULOSIN HYDROCHLORIDE 0.4 MG/1
CAPSULE ORAL
Qty: 90 CAPSULE | Refills: 3 | Status: SHIPPED | OUTPATIENT
Start: 2022-11-08 | End: 2023-02-22 | Stop reason: SDUPTHER

## 2022-11-08 RX ORDER — CELECOXIB 200 MG/1
CAPSULE ORAL
Qty: 30 CAPSULE | Refills: 1 | Status: SHIPPED | OUTPATIENT
Start: 2022-11-08 | End: 2023-08-01 | Stop reason: SDUPTHER

## 2022-11-29 LAB — HBA1C MFR BLD: 5.6 % (ref 4.8–5.6)

## 2022-12-07 ENCOUNTER — OFFICE VISIT (OUTPATIENT)
Dept: MEDICAL GROUP | Facility: LAB | Age: 57
End: 2022-12-07
Payer: COMMERCIAL

## 2022-12-07 VITALS
RESPIRATION RATE: 12 BRPM | BODY MASS INDEX: 33.23 KG/M2 | WEIGHT: 237.4 LBS | OXYGEN SATURATION: 97 % | TEMPERATURE: 97.1 F | SYSTOLIC BLOOD PRESSURE: 116 MMHG | DIASTOLIC BLOOD PRESSURE: 68 MMHG | HEART RATE: 62 BPM | HEIGHT: 71 IN

## 2022-12-07 DIAGNOSIS — R93.1 AGATSTON CAC SCORE, <100: ICD-10-CM

## 2022-12-07 DIAGNOSIS — E78.2 MIXED HYPERLIPIDEMIA: ICD-10-CM

## 2022-12-07 DIAGNOSIS — Z23 NEED FOR VACCINATION: ICD-10-CM

## 2022-12-07 PROCEDURE — 99214 OFFICE O/P EST MOD 30 MIN: CPT | Mod: 25 | Performed by: FAMILY MEDICINE

## 2022-12-07 PROCEDURE — 90471 IMMUNIZATION ADMIN: CPT | Performed by: FAMILY MEDICINE

## 2022-12-07 PROCEDURE — 90686 IIV4 VACC NO PRSV 0.5 ML IM: CPT | Performed by: FAMILY MEDICINE

## 2022-12-07 RX ORDER — SIMVASTATIN 10 MG
10 TABLET ORAL NIGHTLY
Qty: 90 TABLET | Refills: 3 | Status: SHIPPED | OUTPATIENT
Start: 2022-12-07 | End: 2023-02-22 | Stop reason: SDUPTHER

## 2022-12-07 NOTE — PROGRESS NOTES
"Subjective:     CC: CAC follow up, lipids    HPI:   Fabio Bustamante presents today to follow-up on hyperlipidemia and CAC score.  Repeat CAC score recently had increased to 97.4 from 2.0 5 years ago.  He has history of elevated cholesterol but had been off of statin due to reassuring CAC score in the past.      Medications, past medical history, allergies, and social history have been reviewed and updated.      Objective:       Exam:  /68 (BP Location: Right arm, Patient Position: Sitting, BP Cuff Size: Adult)   Pulse 62   Temp 36.2 °C (97.1 °F)   Resp 12   Ht 1.803 m (5' 11\")   Wt 108 kg (237 lb 6.4 oz)   SpO2 97%   BMI 33.11 kg/m²  Body mass index is 33.11 kg/m².    Constitutional: Alert. Well appearing. No distress.  Skin: Warm, dry, good turgor, no visible rashes.  Eye: Equal, round and reactive to light, conjunctiva clear, lids normal.  Respiratory: Normal effort.   Neuro: Moves all four extremities. No facial droop.  Psych: Answers questions appropriately. Normal affect and mood.      Assessment & Plan:     57 y.o. male with the following -     1. Agatston CAC score, <100    2. Mixed hyperlipidemia  CAC score increased to 97.4, had been 2.0 5 years ago.  He had preferred to stay off of statin but does agree to trial low-dose.  Had been on Zocor in the past and he would like to restart this.  Has labs previously ordered and he will recheck labs including cholesterol in 4 to 6 weeks.  - simvastatin (ZOCOR) 10 MG Tab; Take 1 Tablet by mouth every evening.  Dispense: 90 Tablet; Refill: 3    3. Need for vaccination  - INFLUENZA VACCINE QUAD INJ (PF)       Please note that this note was created using voice recognition software.      "

## 2023-01-31 ENCOUNTER — TELEPHONE (OUTPATIENT)
Dept: MEDICAL GROUP | Facility: LAB | Age: 58
End: 2023-01-31
Payer: COMMERCIAL

## 2023-01-31 DIAGNOSIS — H60.339 SWIMMER'S EAR, UNSPECIFIED CHRONICITY, UNSPECIFIED LATERALITY: ICD-10-CM

## 2023-01-31 NOTE — TELEPHONE ENCOUNTER
1. Caller Name: Rene Bolaños                        Call Back Number: [phone]      How would the patient prefer to be contacted with a response: Phone call do NOT leave a detailed message    2. SPECIFIC Action To Be Taken: Referral pending, please sign.    3. Diagnosis/Clinical Reason for Request: Swimmers ear     4. Specialty & Provider Name/Lab/Imaging Location: ENT - Dr. Lorrie Baum    5. Is appointment scheduled for requested order/referral: no    Patient was informed they will receive a return phone call from the office ONLY if there are any questions before processing their request. Advised to call back if they haven't received a call from the referral department in 5 days.

## 2023-02-22 DIAGNOSIS — E78.2 MIXED HYPERLIPIDEMIA: ICD-10-CM

## 2023-02-22 DIAGNOSIS — R93.1 AGATSTON CAC SCORE, <100: ICD-10-CM

## 2023-02-22 DIAGNOSIS — N40.1 BENIGN PROSTATIC HYPERPLASIA WITH LOWER URINARY TRACT SYMPTOMS, SYMPTOM DETAILS UNSPECIFIED: ICD-10-CM

## 2023-02-22 RX ORDER — SIMVASTATIN 10 MG
10 TABLET ORAL NIGHTLY
Qty: 90 TABLET | Refills: 3 | Status: SHIPPED | OUTPATIENT
Start: 2023-02-22 | End: 2024-02-28

## 2023-02-22 RX ORDER — OLMESARTAN MEDOXOMIL 5 MG/1
5 TABLET ORAL DAILY
Qty: 90 TABLET | Refills: 3 | Status: SHIPPED | OUTPATIENT
Start: 2023-02-22 | End: 2023-10-25 | Stop reason: SDUPTHER

## 2023-02-22 RX ORDER — TAMSULOSIN HYDROCHLORIDE 0.4 MG/1
0.4 CAPSULE ORAL
Qty: 90 CAPSULE | Refills: 3 | Status: SHIPPED | OUTPATIENT
Start: 2023-02-22

## 2023-02-22 NOTE — TELEPHONE ENCOUNTER
Received request via: Pharmacy    Was the patient seen in the last year in this department? Yes  12/7/22  Does the patient have an active prescription (recently filled or refills available) for medication(s) requested? No    Does the patient have snf Plus and need 100 day supply (blood pressure, diabetes and cholesterol meds only)? Patient does not have SCP

## 2023-07-27 ENCOUNTER — HOSPITAL ENCOUNTER (OUTPATIENT)
Dept: LAB | Facility: MEDICAL CENTER | Age: 58
End: 2023-07-27
Attending: FAMILY MEDICINE
Payer: COMMERCIAL

## 2023-07-27 DIAGNOSIS — Z13.1 SCREENING FOR DIABETES MELLITUS: ICD-10-CM

## 2023-07-27 DIAGNOSIS — E78.2 MIXED HYPERLIPIDEMIA: ICD-10-CM

## 2023-07-27 LAB
ALBUMIN SERPL BCP-MCNC: 4.5 G/DL (ref 3.2–4.9)
ALBUMIN/GLOB SERPL: 1.6 G/DL
ALP SERPL-CCNC: 61 U/L (ref 30–99)
ALT SERPL-CCNC: 31 U/L (ref 2–50)
ANION GAP SERPL CALC-SCNC: 14 MMOL/L (ref 7–16)
AST SERPL-CCNC: 27 U/L (ref 12–45)
BASOPHILS # BLD AUTO: 1.4 % (ref 0–1.8)
BASOPHILS # BLD: 0.07 K/UL (ref 0–0.12)
BILIRUB SERPL-MCNC: 0.7 MG/DL (ref 0.1–1.5)
BUN SERPL-MCNC: 24 MG/DL (ref 8–22)
CALCIUM ALBUM COR SERPL-MCNC: 9.2 MG/DL (ref 8.5–10.5)
CALCIUM SERPL-MCNC: 9.6 MG/DL (ref 8.5–10.5)
CHLORIDE SERPL-SCNC: 103 MMOL/L (ref 96–112)
CHOLEST SERPL-MCNC: 186 MG/DL (ref 100–199)
CO2 SERPL-SCNC: 22 MMOL/L (ref 20–33)
CREAT SERPL-MCNC: 1.06 MG/DL (ref 0.5–1.4)
EOSINOPHIL # BLD AUTO: 0.15 K/UL (ref 0–0.51)
EOSINOPHIL NFR BLD: 2.9 % (ref 0–6.9)
ERYTHROCYTE [DISTWIDTH] IN BLOOD BY AUTOMATED COUNT: 40.9 FL (ref 35.9–50)
EST. AVERAGE GLUCOSE BLD GHB EST-MCNC: 103 MG/DL
FASTING STATUS PATIENT QL REPORTED: NORMAL
GFR SERPLBLD CREATININE-BSD FMLA CKD-EPI: 80 ML/MIN/1.73 M 2
GLOBULIN SER CALC-MCNC: 2.9 G/DL (ref 1.9–3.5)
GLUCOSE SERPL-MCNC: 86 MG/DL (ref 65–99)
HBA1C MFR BLD: 5.2 % (ref 4–5.6)
HCT VFR BLD AUTO: 47 % (ref 42–52)
HDLC SERPL-MCNC: 30 MG/DL
HGB BLD-MCNC: 15.3 G/DL (ref 14–18)
IMM GRANULOCYTES # BLD AUTO: 0.01 K/UL (ref 0–0.11)
IMM GRANULOCYTES NFR BLD AUTO: 0.2 % (ref 0–0.9)
LDLC SERPL CALC-MCNC: 105 MG/DL
LYMPHOCYTES # BLD AUTO: 1.49 K/UL (ref 1–4.8)
LYMPHOCYTES NFR BLD: 29.2 % (ref 22–41)
MCH RBC QN AUTO: 28.2 PG (ref 27–33)
MCHC RBC AUTO-ENTMCNC: 32.6 G/DL (ref 32.3–36.5)
MCV RBC AUTO: 86.7 FL (ref 81.4–97.8)
MONOCYTES # BLD AUTO: 0.49 K/UL (ref 0–0.85)
MONOCYTES NFR BLD AUTO: 9.6 % (ref 0–13.4)
NEUTROPHILS # BLD AUTO: 2.89 K/UL (ref 1.82–7.42)
NEUTROPHILS NFR BLD: 56.7 % (ref 44–72)
NRBC # BLD AUTO: 0 K/UL
NRBC BLD-RTO: 0 /100 WBC (ref 0–0.2)
PLATELET # BLD AUTO: 353 K/UL (ref 164–446)
PMV BLD AUTO: 9.3 FL (ref 9–12.9)
POTASSIUM SERPL-SCNC: 4.1 MMOL/L (ref 3.6–5.5)
PROT SERPL-MCNC: 7.4 G/DL (ref 6–8.2)
RBC # BLD AUTO: 5.42 M/UL (ref 4.7–6.1)
SODIUM SERPL-SCNC: 139 MMOL/L (ref 135–145)
TRIGL SERPL-MCNC: 256 MG/DL (ref 0–149)
WBC # BLD AUTO: 5.1 K/UL (ref 4.8–10.8)

## 2023-07-27 PROCEDURE — 80053 COMPREHEN METABOLIC PANEL: CPT

## 2023-07-27 PROCEDURE — 36415 COLL VENOUS BLD VENIPUNCTURE: CPT

## 2023-07-27 PROCEDURE — 85025 COMPLETE CBC W/AUTO DIFF WBC: CPT

## 2023-07-27 PROCEDURE — 83036 HEMOGLOBIN GLYCOSYLATED A1C: CPT

## 2023-07-27 PROCEDURE — 80061 LIPID PANEL: CPT

## 2023-08-01 ENCOUNTER — TELEPHONE (OUTPATIENT)
Dept: MEDICAL GROUP | Facility: LAB | Age: 58
End: 2023-08-01

## 2023-08-01 ENCOUNTER — OFFICE VISIT (OUTPATIENT)
Dept: MEDICAL GROUP | Facility: LAB | Age: 58
End: 2023-08-01
Payer: COMMERCIAL

## 2023-08-01 VITALS
RESPIRATION RATE: 14 BRPM | DIASTOLIC BLOOD PRESSURE: 68 MMHG | HEART RATE: 60 BPM | HEIGHT: 71 IN | WEIGHT: 242.4 LBS | BODY MASS INDEX: 33.94 KG/M2 | SYSTOLIC BLOOD PRESSURE: 118 MMHG | OXYGEN SATURATION: 96 % | TEMPERATURE: 97.8 F

## 2023-08-01 DIAGNOSIS — Z11.59 ENCOUNTER FOR HEPATITIS C SCREENING TEST FOR LOW RISK PATIENT: ICD-10-CM

## 2023-08-01 DIAGNOSIS — E78.2 MIXED HYPERLIPIDEMIA: ICD-10-CM

## 2023-08-01 DIAGNOSIS — Z12.5 SCREENING FOR PROSTATE CANCER: ICD-10-CM

## 2023-08-01 DIAGNOSIS — Z00.00 WELL ADULT EXAM: ICD-10-CM

## 2023-08-01 DIAGNOSIS — R93.1 AGATSTON CAC SCORE, <100: ICD-10-CM

## 2023-08-01 PROCEDURE — 3074F SYST BP LT 130 MM HG: CPT | Performed by: FAMILY MEDICINE

## 2023-08-01 PROCEDURE — 99396 PREV VISIT EST AGE 40-64: CPT | Performed by: FAMILY MEDICINE

## 2023-08-01 PROCEDURE — 3078F DIAST BP <80 MM HG: CPT | Performed by: FAMILY MEDICINE

## 2023-08-01 RX ORDER — AMOXICILLIN 875 MG/1
875 TABLET, COATED ORAL 2 TIMES DAILY
COMMUNITY
Start: 2023-07-25 | End: 2023-08-01

## 2023-08-01 RX ORDER — PANTOPRAZOLE SODIUM 40 MG/1
TABLET, DELAYED RELEASE ORAL
COMMUNITY
Start: 2022-12-27

## 2023-08-01 RX ORDER — CELECOXIB 200 MG/1
200 CAPSULE ORAL DAILY
Qty: 10 CAPSULE | Refills: 3 | Status: SHIPPED | OUTPATIENT
Start: 2023-08-01

## 2023-08-01 RX ORDER — PANTOPRAZOLE SODIUM 40 MG/1
TABLET, DELAYED RELEASE ORAL
COMMUNITY
Start: 2023-07-11 | End: 2023-08-01

## 2023-08-01 ASSESSMENT — FIBROSIS 4 INDEX: FIB4 SCORE: 0.81

## 2023-08-01 ASSESSMENT — PATIENT HEALTH QUESTIONNAIRE - PHQ9: CLINICAL INTERPRETATION OF PHQ2 SCORE: 0

## 2023-08-01 NOTE — PROGRESS NOTES
Subjective:     CC:   Chief Complaint   Patient presents with    Annual Exam     Health screening form for employer       HPI:   Fabio Bolaños is a 57 y.o. male who presents for an annual exam. He is feeling well and has no complaints.    Last colonoscopy: ~2013 - due for repeat this year  Last Tdap: 2015   Qualify for abdominal us screen: No  Qualify for hep c screen: ordered  Regular exercise: yes  Diet: Reports this could do some improvement    He  has a past medical history of Dysmetabolic syndrome X (5/25/2011), Hyperlipoproteinemia (5/25/2011), Labile hypertension (5/25/2011), and Sleep apnea (5/25/2011).  He  has no past surgical history on file.  Family History   Problem Relation Age of Onset    Heart Attack Father     Stroke Mother      Social History     Tobacco Use    Smoking status: Never    Smokeless tobacco: Never   Vaping Use    Vaping Use: Never used   Substance Use Topics    Alcohol use: No       Patient Active Problem List    Diagnosis Date Noted    Benign prostatic hyperplasia 04/07/2021    Lipoma 04/07/2021    Agatston CAC score, <100 04/23/2019    BMI 32.0-32.9,adult 10/23/2018    Hypertension 03/09/2015    Dysmetabolic syndrome X 05/25/2011    Hyperlipidemia 05/25/2011    Sleep apnea 05/25/2011       Current Outpatient Medications   Medication Sig Dispense Refill    pantoprazole (PROTONIX) 40 MG Tablet Delayed Response TAKE 1 TABLET BY MOUTH EVERY DAY 30 MINUTES BEFORE BREAKFAST MEAL      simvastatin (ZOCOR) 10 MG Tab Take 1 Tablet by mouth every evening. 90 Tablet 3    tamsulosin (FLOMAX) 0.4 MG capsule Take 1 Capsule by mouth 1/2 hour after breakfast. 90 Capsule 3    celecoxib (CELEBREX) 200 MG Cap TAKE 1 CAPSULE BY MOUTH DAILY AS NEEDED FOR BACK PAIN (Patient taking differently: PRN) 30 Capsule 1    olmesartan (BENICAR) 5 MG tablet Take 1 Tablet by mouth every day. (Patient taking differently: Take 2.5 mg by mouth every day. Half tab daily) 90 Tablet 3     No current  "facility-administered medications for this visit.    (including changes today)  Allergies: Patient has no known allergies.    Review of Systems   Constitutional: Negative for fever, chills and malaise/fatigue.   HENT: Negative for congestion.    Eyes: Negative for pain.   Respiratory: Negative for cough and shortness of breath.    Cardiovascular: Negative for leg swelling.   Gastrointestinal: Negative for nausea, vomiting, abdominal pain and diarrhea.   Genitourinary: Negative for dysuria and hematuria.   Skin: Negative for rash.   Neurological: Negative for dizziness, focal weakness and headaches.   Endo/Heme/Allergies: Does not bruise/bleed easily.   Psychiatric/Behavioral: Negative for depression.  The patient is not nervous/anxious.      Objective:     /68 (BP Location: Right arm, Patient Position: Sitting, BP Cuff Size: Large adult)   Pulse 60   Temp 36.6 °C (97.8 °F)   Resp 14   Ht 1.803 m (5' 11\")   Wt 110 kg (242 lb 6.4 oz)   SpO2 96%   BMI 33.81 kg/m²   Body mass index is 33.81 kg/m².  Wt Readings from Last 4 Encounters:   08/01/23 110 kg (242 lb 6.4 oz)   12/07/22 108 kg (237 lb 6.4 oz)   08/31/22 109 kg (239 lb 12.8 oz)   01/31/22 111 kg (245 lb)       Physical Exam:  Constitutional: Well-developed and well-nourished. Not diaphoretic. No distress.   Skin: Skin is warm and dry. No rash noted.  Head: Atraumatic without lesions.  Eyes: Conjunctivae and extraocular motions are normal. Pupils are equal, round, and reactive to light. No scleral icterus.   Ears:  External ears unremarkable.   Nose: Nares patent. Septum midline.   Mouth/Throat: Dentition is normal. Tongue normal. Oropharynx is clear and moist. Posterior pharynx without erythema or exudates.  Neck: Supple, trachea midline. Normal range of motion. No thyromegaly present. No lymphadenopathy--cervical or supraclavicular.  Cardiovascular: Regular rate and rhythm, S1 and S2 without murmur, rubs, or gallops.    Chest: Effort normal. Clear " to auscultation throughout.  Extremities: No cyanosis, clubbing, erythema, nor edema. Distal pulses intact and symmetric.   Musculoskeletal: All major joints AROM full in all directions without pain.  Neurological: Alert and oriented x 3. No cranial nerve deficit. Sensation to extremities grossly intact to light touch.  Psychiatric:  Behavior, mood, and affect are appropriate.    Assessment and Plan:     1. Well adult exam  Health maintenance reviewed and updated.  Age-appropriate anticipatory guidance provided.    2. Agatston CAC score, <100  3. Mixed hyperlipidemia  LDL above goal at 105.  We discussed increasing statin dose but he would prefer to work on diet and exercise changes with recheck in 2 to 3 months.  - Lipid Profile; Future    4. Encounter for hepatitis C screening test for low risk patient  - HEP C VIRUS ANTIBODY; Future    5. Screening for prostate cancer  - PROSTATE SPECIFIC AG SCREENING; Future    Follow-up: Return in about 1 year (around 8/1/2024), or if symptoms worsen or fail to improve.    Please note that this note was created using voice recognition software.

## 2023-08-02 NOTE — TELEPHONE ENCOUNTER
MEDICATION PRIOR AUTHORIZATION NEEDED:    1. Name of Medication: celecoxib (CELEBREX) 200 MG Cap    2. Requested By (Name of Pharmacy): [facility] - [address]  P: [phone]  F: [phone]     3. Is insurance on file current? Yes    4. What is the name & phone number of the 3rd party payor?   [employer]

## 2023-08-02 NOTE — TELEPHONE ENCOUNTER
Prior authorization needed for celecoxib (CELEBREX) 200 MG Cap through CoverMyMeds.     Key: [id number]

## 2023-08-02 NOTE — TELEPHONE ENCOUNTER
FINAL PRIOR AUTHORIZATION STATUS:    1.  Name of Medication & Dose:  celecoxib (CELEBREX) 200 MG Cap    2. Prior Auth Status: Approved through 08/01/2024     3. Action Taken: Pharmacy Notified: yes Patient Notified: no

## 2023-10-25 ENCOUNTER — OFFICE VISIT (OUTPATIENT)
Dept: MEDICAL GROUP | Facility: LAB | Age: 58
End: 2023-10-25
Payer: COMMERCIAL

## 2023-10-25 VITALS
TEMPERATURE: 97 F | OXYGEN SATURATION: 94 % | HEART RATE: 62 BPM | RESPIRATION RATE: 20 BRPM | DIASTOLIC BLOOD PRESSURE: 90 MMHG | SYSTOLIC BLOOD PRESSURE: 158 MMHG | BODY MASS INDEX: 34.72 KG/M2 | HEIGHT: 71 IN | WEIGHT: 248 LBS

## 2023-10-25 DIAGNOSIS — R51.9 NONINTRACTABLE HEADACHE, UNSPECIFIED CHRONICITY PATTERN, UNSPECIFIED HEADACHE TYPE: ICD-10-CM

## 2023-10-25 DIAGNOSIS — I10 PRIMARY HYPERTENSION: ICD-10-CM

## 2023-10-25 PROCEDURE — 99214 OFFICE O/P EST MOD 30 MIN: CPT | Performed by: STUDENT IN AN ORGANIZED HEALTH CARE EDUCATION/TRAINING PROGRAM

## 2023-10-25 PROCEDURE — 3077F SYST BP >= 140 MM HG: CPT | Performed by: STUDENT IN AN ORGANIZED HEALTH CARE EDUCATION/TRAINING PROGRAM

## 2023-10-25 PROCEDURE — 3080F DIAST BP >= 90 MM HG: CPT | Performed by: STUDENT IN AN ORGANIZED HEALTH CARE EDUCATION/TRAINING PROGRAM

## 2023-10-25 RX ORDER — OLMESARTAN MEDOXOMIL 5 MG/1
2.5 TABLET ORAL DAILY
Qty: 45 TABLET | Refills: 3 | Status: SHIPPED | OUTPATIENT
Start: 2023-10-25 | End: 2023-11-22

## 2023-10-25 ASSESSMENT — ENCOUNTER SYMPTOMS
DIZZINESS: 0
SORE THROAT: 0
COUGH: 0
FOCAL WEAKNESS: 0
SHORTNESS OF BREATH: 0
SINUS PAIN: 0
WEIGHT LOSS: 0
CHILLS: 0
HEADACHES: 1
NECK PAIN: 1
SPEECH CHANGE: 0
FEVER: 0
VOMITING: 0
NAUSEA: 0
DIARRHEA: 0
ABDOMINAL PAIN: 0

## 2023-10-25 ASSESSMENT — FIBROSIS 4 INDEX: FIB4 SCORE: 0.82

## 2023-10-25 NOTE — PATIENT INSTRUCTIONS
Ideal blood pressure <130/80 and at least <140/90.  If <100 for the top number we are over treating.  Bring in log/machine to follow up.  If not controlled can take 5mg instead of 1/2 tablet

## 2023-10-25 NOTE — PROGRESS NOTES
"Subjective:     Chief Complaint   Patient presents with    Headache     3 weeks      HPI:   Fabio Bustamante presents today headache for 3 weeks. PCP unavailable.    Has a history of migraine (behind eye, dizzy/nauseated, photophobia, blurry vision, would last all day) since his 20s but has been infrequent occurring only a few times a year.    Patient reports new headaches that are different than his typical migraines for the past 3 weeks.  Correlating with this onset is that he has not taken his Benicar for at least a month.  This was due to [facility] not sending him the medication.  Previously took 1/2 tablet of 5 mg daily.  In addition to this change he completely stopped drinking caffeine approximately 3 weeks ago.  States that the headache today is already improved after drinking a Mountain Dew.    States that the headaches more recently have been on the left behind the eye and into the face, dull with occasional neck discomfort and more mild than his typical migraines.  No visual disturbances, fever, chills, jaw claudication or upper respiratory complaints. Exercise doesn't make headache it worse.     Review of Systems   Constitutional:  Negative for chills, fever, malaise/fatigue and weight loss.   HENT:  Negative for congestion, ear pain, hearing loss, sinus pain and sore throat.    Respiratory:  Negative for cough and shortness of breath.    Cardiovascular:  Negative for chest pain and leg swelling.   Gastrointestinal:  Negative for abdominal pain, diarrhea, nausea and vomiting.   Musculoskeletal:  Positive for neck pain.   Neurological:  Positive for headaches. Negative for dizziness, speech change and focal weakness.     Objective:     Exam:  BP (!) 158/90 (BP Location: Left arm, Patient Position: Sitting, BP Cuff Size: Adult)   Pulse 62   Temp 36.1 °C (97 °F) (Temporal)   Resp 20   Ht 1.803 m (5' 11\")   Wt 112 kg (248 lb)   SpO2 94%   BMI 34.59 kg/m²  Body mass index is 34.59 kg/m².    Physical " Exam  Vitals reviewed.   Constitutional:       General: He is not in acute distress.     Appearance: Normal appearance. He is obese. He is not ill-appearing.   HENT:      Head: Normocephalic and atraumatic.      Right Ear: Tympanic membrane, ear canal and external ear normal.      Left Ear: Tympanic membrane, ear canal and external ear normal.      Nose: Nose normal.      Right Sinus: No maxillary sinus tenderness or frontal sinus tenderness.      Left Sinus: No maxillary sinus tenderness or frontal sinus tenderness.      Mouth/Throat:      Mouth: Mucous membranes are moist.      Pharynx: No oropharyngeal exudate or posterior oropharyngeal erythema.   Eyes:      General: No scleral icterus.     Conjunctiva/sclera: Conjunctivae normal.   Cardiovascular:      Rate and Rhythm: Normal rate and regular rhythm.      Heart sounds: Normal heart sounds. No murmur heard.  Pulmonary:      Effort: Pulmonary effort is normal. No respiratory distress.      Breath sounds: Normal breath sounds. No stridor. No wheezing, rhonchi or rales.   Musculoskeletal:      Cervical back: Normal range of motion and neck supple.      Right lower leg: No edema.      Left lower leg: No edema.   Skin:     General: Skin is warm and dry.   Neurological:      General: No focal deficit present.      Mental Status: He is alert and oriented to person, place, and time.      Cranial Nerves: No cranial nerve deficit.      Gait: Gait normal.   Psychiatric:         Mood and Affect: Mood normal.         Behavior: Behavior normal.         Thought Content: Thought content normal.       Labs: Reviewed from 7/27/2023    Assessment & Plan:     58 y.o. male with the following -     1. Nonintractable headache, unspecified chronicity pattern, unspecified headache type  3 weeks of headache as above in this patient with prior history of migraine who recently stopped all caffeine 3 weeks ago and has been out of his blood pressure medication for the past month.  No red  flags and neurologic exam normal.  Discussed potential etiology with caffeine withdrawal headache possible especially given improvement after caffeine earlier today.  Blood pressure uncontrolled, see below.  Patient to introduce caffeine back and then start a slow taper rather than abrupt discontinuation.  Blood pressure management as below.  Gave strict ER precautions.  Plan for close interval follow-up.    2. Primary hypertension  This is a chronic condition, poorly controlled off his medication due to pharmacy issue.  Patient taking in the usual dose of 2.5 mg daily and we discussed that this may not offer a consistent dose since the pill he reports is quite small.  Regardless we will resume at prior dose of 2.5 mg daily, pacing can increase to 5 mg daily if not at goal at home.  Patient to bring in a blood pressure log in his machine to crosscheck.  Discussed some things which may also improve blood pressure.  Discussed ideal ranges and return precautions.  - olmesartan (BENICAR) 5 MG tablet; Take 0.5 Tablets by mouth every day.  Dispense: 45 Tablet; Refill: 3    Return in about 2 weeks (around 11/8/2023), or if symptoms worsen or fail to improve, for Blood pressure.    Please note that this dictation was created using voice recognition software. I have made every reasonable attempt to correct obvious errors, but I expect that there are errors of grammar and possibly content that I did not discover before finalizing the note.

## 2023-11-08 ENCOUNTER — HOSPITAL ENCOUNTER (OUTPATIENT)
Dept: LAB | Facility: MEDICAL CENTER | Age: 58
End: 2023-11-08
Attending: STUDENT IN AN ORGANIZED HEALTH CARE EDUCATION/TRAINING PROGRAM
Payer: COMMERCIAL

## 2023-11-08 ENCOUNTER — OFFICE VISIT (OUTPATIENT)
Dept: MEDICAL GROUP | Facility: LAB | Age: 58
End: 2023-11-08
Payer: COMMERCIAL

## 2023-11-08 VITALS
TEMPERATURE: 97.3 F | DIASTOLIC BLOOD PRESSURE: 70 MMHG | SYSTOLIC BLOOD PRESSURE: 142 MMHG | HEART RATE: 60 BPM | WEIGHT: 247 LBS | HEIGHT: 71 IN | RESPIRATION RATE: 16 BRPM | OXYGEN SATURATION: 94 % | BODY MASS INDEX: 34.58 KG/M2

## 2023-11-08 DIAGNOSIS — R51.9 NEW ONSET OF HEADACHES AFTER AGE 50: ICD-10-CM

## 2023-11-08 DIAGNOSIS — I10 PRIMARY HYPERTENSION: ICD-10-CM

## 2023-11-08 DIAGNOSIS — Z23 NEED FOR VACCINATION: ICD-10-CM

## 2023-11-08 LAB
ALBUMIN SERPL BCP-MCNC: 4.8 G/DL (ref 3.2–4.9)
ALBUMIN/GLOB SERPL: 1.6 G/DL
ALP SERPL-CCNC: 57 U/L (ref 30–99)
ALT SERPL-CCNC: 27 U/L (ref 2–50)
ANION GAP SERPL CALC-SCNC: 10 MMOL/L (ref 7–16)
AST SERPL-CCNC: 18 U/L (ref 12–45)
BASOPHILS # BLD AUTO: 1.3 % (ref 0–1.8)
BASOPHILS # BLD: 0.07 K/UL (ref 0–0.12)
BILIRUB SERPL-MCNC: 0.6 MG/DL (ref 0.1–1.5)
BUN SERPL-MCNC: 24 MG/DL (ref 8–22)
CALCIUM ALBUM COR SERPL-MCNC: 9 MG/DL (ref 8.5–10.5)
CALCIUM SERPL-MCNC: 9.6 MG/DL (ref 8.5–10.5)
CHLORIDE SERPL-SCNC: 102 MMOL/L (ref 96–112)
CO2 SERPL-SCNC: 27 MMOL/L (ref 20–33)
CREAT SERPL-MCNC: 1.05 MG/DL (ref 0.5–1.4)
CRP SERPL HS-MCNC: <0.3 MG/DL (ref 0–0.75)
EOSINOPHIL # BLD AUTO: 0.15 K/UL (ref 0–0.51)
EOSINOPHIL NFR BLD: 2.7 % (ref 0–6.9)
ERYTHROCYTE [DISTWIDTH] IN BLOOD BY AUTOMATED COUNT: 39.5 FL (ref 35.9–50)
ERYTHROCYTE [SEDIMENTATION RATE] IN BLOOD BY WESTERGREN METHOD: 5 MM/HOUR (ref 0–20)
GFR SERPLBLD CREATININE-BSD FMLA CKD-EPI: 81 ML/MIN/1.73 M 2
GLOBULIN SER CALC-MCNC: 3 G/DL (ref 1.9–3.5)
GLUCOSE SERPL-MCNC: 85 MG/DL (ref 65–99)
HCT VFR BLD AUTO: 48.4 % (ref 42–52)
HGB BLD-MCNC: 16.3 G/DL (ref 14–18)
IMM GRANULOCYTES # BLD AUTO: 0.01 K/UL (ref 0–0.11)
IMM GRANULOCYTES NFR BLD AUTO: 0.2 % (ref 0–0.9)
LYMPHOCYTES # BLD AUTO: 1.76 K/UL (ref 1–4.8)
LYMPHOCYTES NFR BLD: 31.8 % (ref 22–41)
MCH RBC QN AUTO: 27.9 PG (ref 27–33)
MCHC RBC AUTO-ENTMCNC: 33.7 G/DL (ref 32.3–36.5)
MCV RBC AUTO: 82.9 FL (ref 81.4–97.8)
MONOCYTES # BLD AUTO: 0.52 K/UL (ref 0–0.85)
MONOCYTES NFR BLD AUTO: 9.4 % (ref 0–13.4)
NEUTROPHILS # BLD AUTO: 3.03 K/UL (ref 1.82–7.42)
NEUTROPHILS NFR BLD: 54.6 % (ref 44–72)
NRBC # BLD AUTO: 0 K/UL
NRBC BLD-RTO: 0 /100 WBC (ref 0–0.2)
PLATELET # BLD AUTO: 335 K/UL (ref 164–446)
PMV BLD AUTO: 9.4 FL (ref 9–12.9)
POTASSIUM SERPL-SCNC: 4 MMOL/L (ref 3.6–5.5)
PROT SERPL-MCNC: 7.8 G/DL (ref 6–8.2)
RBC # BLD AUTO: 5.84 M/UL (ref 4.7–6.1)
SODIUM SERPL-SCNC: 139 MMOL/L (ref 135–145)
TSH SERPL DL<=0.005 MIU/L-ACNC: 1.98 UIU/ML (ref 0.38–5.33)
WBC # BLD AUTO: 5.5 K/UL (ref 4.8–10.8)

## 2023-11-08 PROCEDURE — 90471 IMMUNIZATION ADMIN: CPT | Performed by: STUDENT IN AN ORGANIZED HEALTH CARE EDUCATION/TRAINING PROGRAM

## 2023-11-08 PROCEDURE — 86140 C-REACTIVE PROTEIN: CPT

## 2023-11-08 PROCEDURE — 90686 IIV4 VACC NO PRSV 0.5 ML IM: CPT | Performed by: STUDENT IN AN ORGANIZED HEALTH CARE EDUCATION/TRAINING PROGRAM

## 2023-11-08 PROCEDURE — 85025 COMPLETE CBC W/AUTO DIFF WBC: CPT

## 2023-11-08 PROCEDURE — 84443 ASSAY THYROID STIM HORMONE: CPT

## 2023-11-08 PROCEDURE — 85652 RBC SED RATE AUTOMATED: CPT

## 2023-11-08 PROCEDURE — 80053 COMPREHEN METABOLIC PANEL: CPT

## 2023-11-08 PROCEDURE — 36415 COLL VENOUS BLD VENIPUNCTURE: CPT

## 2023-11-08 PROCEDURE — 99214 OFFICE O/P EST MOD 30 MIN: CPT | Mod: 25 | Performed by: STUDENT IN AN ORGANIZED HEALTH CARE EDUCATION/TRAINING PROGRAM

## 2023-11-08 PROCEDURE — 3077F SYST BP >= 140 MM HG: CPT | Performed by: STUDENT IN AN ORGANIZED HEALTH CARE EDUCATION/TRAINING PROGRAM

## 2023-11-08 PROCEDURE — 3078F DIAST BP <80 MM HG: CPT | Performed by: STUDENT IN AN ORGANIZED HEALTH CARE EDUCATION/TRAINING PROGRAM

## 2023-11-08 ASSESSMENT — FIBROSIS 4 INDEX: FIB4 SCORE: 0.82

## 2023-11-08 NOTE — PATIENT INSTRUCTIONS
Ideal blood pressure <130/80 and at least <140/90.  If <100 for the top number we are over treating.    Increase to 10mg daily (2 5mg tablets)    Wear CPAP!

## 2023-11-08 NOTE — PROGRESS NOTES
"Subjective:     Chief Complaint   Patient presents with    Headache     2 weeks follow up     HPI:   Fabio Bustamante presents today for follow up.    See encounter dated 10/25/2023 for details.    Patient has been taking olmesartan 5 mg daily, previously would buy it in half and take 2.5 mg daily.  Has not been able to check his blood pressure because he could not find the cuff.  Has been tolerating this dose without notable side effects.    Continues with daily headaches, today 2 out of 10 without visual disturbances or photophobia.  States that since his last visit he has had 2 or 3 more severe headaches.  Has added back some caffeine which does seem to help.  Admits to not using his CPAP daily only maybe twice a week.  Does not endorse morning headaches always on days he does not use his CPAP.    Patient clarifies that he does not have a history of chronic headaches, only approximately 10 headaches that he would maybe consider migraine in his life.  Denies any jaw pain or claudication.  No visual disturbances.  No photophobia.    Review of Systems   Constitutional:  Negative for chills, fever, malaise/fatigue and weight loss.   Eyes:  Negative for blurred vision, double vision and photophobia.   Respiratory:  Negative for cough and shortness of breath.    Cardiovascular:  Negative for chest pain, palpitations and leg swelling.   Gastrointestinal:  Negative for abdominal pain, diarrhea, nausea and vomiting.   Skin:  Negative for rash.   Neurological:  Positive for headaches. Negative for dizziness.     Objective:     Exam:  BP (!) 142/70   Pulse 60   Temp 36.3 °C (97.3 °F) (Temporal)   Resp 16   Ht 1.803 m (5' 11\")   Wt 112 kg (247 lb)   SpO2 94%   BMI 34.45 kg/m²  Body mass index is 34.45 kg/m².    Physical Exam  Vitals reviewed.   Constitutional:       General: He is not in acute distress.     Appearance: Normal appearance. He is obese. He is not ill-appearing.   HENT:      Head: Normocephalic and " atraumatic.      Nose: Nose normal.      Mouth/Throat:      Mouth: Mucous membranes are moist.   Cardiovascular:      Rate and Rhythm: Normal rate and regular rhythm.      Heart sounds: Normal heart sounds. No murmur heard.  Pulmonary:      Effort: Pulmonary effort is normal. No respiratory distress.      Breath sounds: Normal breath sounds. No wheezing, rhonchi or rales.   Musculoskeletal:      Cervical back: Normal range of motion and neck supple.      Right lower leg: No edema.      Left lower leg: No edema.   Skin:     General: Skin is warm and dry.   Neurological:      General: No focal deficit present.      Mental Status: He is alert and oriented to person, place, and time.      Cranial Nerves: No cranial nerve deficit.      Gait: Gait normal.   Psychiatric:         Mood and Affect: Mood normal.         Behavior: Behavior normal.         Thought Content: Thought content normal.       Assessment & Plan:     58 y.o. male with the following -     1. Primary hypertension  Chronic, uncontrolled.  Patient is leery to increase his medication, discussed that typically 20 mg is a starting dose for Benicar.  He agrees to increase to 10 mg daily.  Patient to purchase another cuff to start monitoring his blood pressure at home.  Discussed ideal ranges/return precautions and ways to improve with lifestyle choices.  Advised CPAP compliance.  - CBC WITH DIFFERENTIAL; Future  - Comp Metabolic Panel; Future  - TSH WITH REFLEX TO FT4; Future    2. New onset of headaches after age 48  Now approximately 5 weeks of headache in this patient who clarifies no significant prior history of headache prior.  Neurologic exam again normal.  Advised compliance with CPAP.  Patient aware of not to abruptly stop caffeine intake which may have been a trigger prior but given that he reports daily but mild intractable headache will evaluate further with labs and imaging as below given age.  Aware of ER precautions.  - Sed Rate; Future  - CRP  QUANTITIVE (NON-CARDIAC); Future  - MR-BRAIN-W/O; Future    3. Need for vaccination  - INFLUENZA VACCINE QUAD INJ (PF)    Return in about 2 weeks (around 11/22/2023), or if symptoms worsen or fail to improve, for Blood pressure.    Please note that this dictation was created using voice recognition software. I have made every reasonable attempt to correct obvious errors, but I expect that there are errors of grammar and possibly content that I did not discover before finalizing the note.

## 2023-11-10 ASSESSMENT — ENCOUNTER SYMPTOMS
NAUSEA: 0
VOMITING: 0
BLURRED VISION: 0
PALPITATIONS: 0
HEADACHES: 1
ABDOMINAL PAIN: 0
SHORTNESS OF BREATH: 0
DOUBLE VISION: 0
PHOTOPHOBIA: 0
DIZZINESS: 0
WEIGHT LOSS: 0
CHILLS: 0
DIARRHEA: 0
COUGH: 0
FEVER: 0

## 2023-11-22 ENCOUNTER — OFFICE VISIT (OUTPATIENT)
Dept: MEDICAL GROUP | Facility: LAB | Age: 58
End: 2023-11-22
Payer: COMMERCIAL

## 2023-11-22 VITALS
HEIGHT: 71 IN | SYSTOLIC BLOOD PRESSURE: 142 MMHG | RESPIRATION RATE: 12 BRPM | DIASTOLIC BLOOD PRESSURE: 70 MMHG | TEMPERATURE: 97.2 F | OXYGEN SATURATION: 95 % | BODY MASS INDEX: 34.44 KG/M2 | HEART RATE: 61 BPM | WEIGHT: 246 LBS

## 2023-11-22 DIAGNOSIS — R51.9 NEW ONSET OF HEADACHES AFTER AGE 50: ICD-10-CM

## 2023-11-22 DIAGNOSIS — I10 PRIMARY HYPERTENSION: ICD-10-CM

## 2023-11-22 DIAGNOSIS — L30.9 DERMATITIS: ICD-10-CM

## 2023-11-22 PROCEDURE — 99214 OFFICE O/P EST MOD 30 MIN: CPT | Performed by: FAMILY MEDICINE

## 2023-11-22 PROCEDURE — 3078F DIAST BP <80 MM HG: CPT | Performed by: FAMILY MEDICINE

## 2023-11-22 PROCEDURE — 3077F SYST BP >= 140 MM HG: CPT | Performed by: FAMILY MEDICINE

## 2023-11-22 RX ORDER — OLMESARTAN MEDOXOMIL 5 MG/1
10 TABLET ORAL DAILY
Qty: 45 TABLET | Refills: 3
Start: 2023-11-22 | End: 2023-12-06

## 2023-11-22 RX ORDER — PREDNISONE 20 MG/1
40 TABLET ORAL DAILY
Qty: 10 TABLET | Refills: 0 | Status: SHIPPED | OUTPATIENT
Start: 2023-11-22 | End: 2023-11-27

## 2023-11-22 RX ORDER — OLMESARTAN MEDOXOMIL 20 MG/1
20 TABLET ORAL DAILY
Qty: 30 TABLET | Status: CANCELLED | OUTPATIENT
Start: 2023-11-22

## 2023-11-22 ASSESSMENT — FIBROSIS 4 INDEX: FIB4 SCORE: 0.62

## 2023-11-22 NOTE — PROGRESS NOTES
"Subjective:     CC: HTN, rash    HPI:   Fabio Bustamante presents today to follow-up on blood pressure and discuss rash.  Benicar was increased to 10 mg 2 weeks ago, not yet checking at home.  He prefers the lower dose of possible    About 2 weeks ago after visit here he noticed diffuse outbreak of itchy, small red spots on trunk, arms and legs.  No new soaps, lotions, detergents.  Did receive flu vaccine that day and he was wondering if possibly related.  No new medications.  Nobody else in the house with similar lesions.  No lesions to the hands.    Medications, past medical history, allergies, and social history have been reviewed and updated.      Objective:       Exam:  BP (!) 142/70   Pulse 61   Temp 36.2 °C (97.2 °F) (Temporal)   Resp 12   Ht 1.803 m (5' 11\")   Wt 112 kg (246 lb)   SpO2 95%   BMI 34.31 kg/m²  Body mass index is 34.31 kg/m².    Constitutional: Alert. Well appearing. No distress.  Skin: Scattered erythematous papules to the upper arms, lower legs, trunk.  No lesions to the palms, wrist, or interdigital spaces.  Some lesions have a more pustular appearance.  ENMT: Moist mucous membranes. Normal dentition.  Respiratory: Normal effort.   Neuro: Moves all four extremities. No facial droop.  Psych: Answers questions appropriately. Normal affect and mood.    Assessment & Plan:     58 y.o. male with the following -     1. Dermatitis  Itchy, erythematous papules to the extremities and trunk.  Some with a pustular appearance.  Minimal improvement with OTC hydrocortisone.  No obvious trigger with new medication or new topical exposure.  Suspect Grovers disease or contact dermatitis versus less likely folliculitis given widespread distribution.  Trial short burst of prednisone and follow-up if not improving.  Given pustular appearance of some lesions would consider short course of antibiotic to cover bacterial folliculitis if do not see significant improvement with prednisone burst.  - predniSONE " (DELTASONE) 20 MG Tab; Take 2 Tablets by mouth every day for 5 days.  Dispense: 10 Tablet; Refill: 0    2. Primary hypertension  Borderline, just increased dose of Benicar to 10 mg daily 2 weeks ago we will hold on changing this for now.  Recheck at next follow-up.    3. New onset of headaches after age 48  Evaluated at prior visit with different provider.  MRI pending and he will follow-up after MRI.      Please note that this note was created using voice recognition software.

## 2023-12-01 ENCOUNTER — APPOINTMENT (OUTPATIENT)
Dept: RADIOLOGY | Facility: MEDICAL CENTER | Age: 58
End: 2023-12-01
Attending: STUDENT IN AN ORGANIZED HEALTH CARE EDUCATION/TRAINING PROGRAM
Payer: COMMERCIAL

## 2023-12-01 DIAGNOSIS — R51.9 NEW ONSET OF HEADACHES AFTER AGE 50: ICD-10-CM

## 2023-12-01 PROCEDURE — 70551 MRI BRAIN STEM W/O DYE: CPT

## 2023-12-06 ENCOUNTER — OFFICE VISIT (OUTPATIENT)
Dept: MEDICAL GROUP | Facility: LAB | Age: 58
End: 2023-12-06
Payer: COMMERCIAL

## 2023-12-06 VITALS
HEART RATE: 69 BPM | RESPIRATION RATE: 12 BRPM | BODY MASS INDEX: 34.58 KG/M2 | SYSTOLIC BLOOD PRESSURE: 142 MMHG | TEMPERATURE: 97.4 F | OXYGEN SATURATION: 94 % | WEIGHT: 247 LBS | DIASTOLIC BLOOD PRESSURE: 74 MMHG | HEIGHT: 71 IN

## 2023-12-06 DIAGNOSIS — R51.9 NEW ONSET OF HEADACHES AFTER AGE 50: ICD-10-CM

## 2023-12-06 DIAGNOSIS — Z12.11 SCREEN FOR COLON CANCER: ICD-10-CM

## 2023-12-06 DIAGNOSIS — I10 PRIMARY HYPERTENSION: ICD-10-CM

## 2023-12-06 DIAGNOSIS — L30.9 DERMATITIS: ICD-10-CM

## 2023-12-06 PROCEDURE — 3078F DIAST BP <80 MM HG: CPT | Performed by: FAMILY MEDICINE

## 2023-12-06 PROCEDURE — 3077F SYST BP >= 140 MM HG: CPT | Performed by: FAMILY MEDICINE

## 2023-12-06 PROCEDURE — 99214 OFFICE O/P EST MOD 30 MIN: CPT | Performed by: FAMILY MEDICINE

## 2023-12-06 RX ORDER — OLMESARTAN MEDOXOMIL 20 MG/1
20 TABLET ORAL DAILY
Qty: 90 TABLET | Refills: 3 | Status: SHIPPED | OUTPATIENT
Start: 2023-12-06 | End: 2024-03-20 | Stop reason: SDUPTHER

## 2023-12-06 RX ORDER — TRIAMCINOLONE ACETONIDE 1 MG/G
1 CREAM TOPICAL 2 TIMES DAILY
Qty: 28.4 G | Refills: 3 | Status: SHIPPED | OUTPATIENT
Start: 2023-12-06

## 2023-12-06 ASSESSMENT — FIBROSIS 4 INDEX: FIB4 SCORE: 0.62

## 2023-12-06 NOTE — PROGRESS NOTES
"Subjective:     CC: Follow up blood pressure, headaches, rash    HPI:   Fabio Bustamante presents today to follow-up on multiple concerns.  MRI was done due to new onset of near daily headaches after 48, this is without concerning findings.  Headaches much improved and nearly resolved.  Currently taking 10 mg of Benicar for blood pressure, not checking at home yet.  Rash improved with course of oral prednisone and topical hydrocortisone seems to help as well.  Still with some itchy spots at the sock line and on the trunk.    Medications, past medical history, allergies, and social history have been reviewed and updated.      Objective:       Exam:  BP (!) 142/74   Pulse 69   Temp 36.3 °C (97.4 °F) (Temporal)   Resp 12   Ht 1.803 m (5' 11\")   Wt 112 kg (247 lb)   SpO2 94%   BMI 34.45 kg/m²  Body mass index is 34.45 kg/m².    Constitutional: Alert. Well appearing. No distress.  Skin: Improved scattered erythematous papules to lower legs and trunk.  Respiratory: Normal effort.  Neuro: Moves all four extremities. No facial droop.  Psych: Answers questions appropriately. Normal affect and mood.      Assessment & Plan:     58 y.o. male with the following -     1. Primary hypertension  BP above goal, increase Benicar to 20 mg daily, follow-up with home log if average above 140/90.  - olmesartan (BENICAR) 20 MG Tab; Take 1 Tablet by mouth every day.  Dispense: 90 Tablet; Refill: 3    2. New onset of headaches after age 48  Reassuring MRI and these are much improved.     3. Dermatitis  Continued but improved itchy erythematous papules to extremities and trunk.  He did see improvement with prednisone burst and OTC hydrocortisone.  Continue to suspect Grovers disease versus contact dermatitis.  Try topical Kenalog and follow-up if not improving.  - triamcinolone acetonide (KENALOG) 0.1 % Cream; Apply 1 Application topically 2 times a day.  Dispense: 28.4 g; Refill: 3    4. Screen for colon cancer  Due for repeat " colonoscopy.  - Referral to GI for Colonoscopy      Please note that this note was created using voice recognition software.

## 2024-02-27 DIAGNOSIS — E78.2 MIXED HYPERLIPIDEMIA: ICD-10-CM

## 2024-02-27 DIAGNOSIS — R93.1 AGATSTON CAC SCORE, <100: ICD-10-CM

## 2024-02-28 RX ORDER — SIMVASTATIN 10 MG
10 TABLET ORAL EVERY EVENING
Qty: 90 TABLET | Refills: 3 | Status: SHIPPED | OUTPATIENT
Start: 2024-02-28

## 2024-02-28 NOTE — TELEPHONE ENCOUNTER
Received request via: Pharmacy    Was the patient seen in the last year in this department? Yes    Does the patient have an active prescription (recently filled or refills available) for medication(s) requested? No    Pharmacy Name: [facility]    [facility] the [facility] and need 100 day supply (blood pressure, diabetes and cholesterol meds only)? Patient does not have [facility]

## 2024-03-20 DIAGNOSIS — I10 PRIMARY HYPERTENSION: ICD-10-CM

## 2024-03-20 RX ORDER — OLMESARTAN MEDOXOMIL 20 MG/1
20 TABLET ORAL DAILY
Qty: 90 TABLET | Refills: 3 | Status: SHIPPED | OUTPATIENT
Start: 2024-03-20

## 2024-03-20 NOTE — TELEPHONE ENCOUNTER
Received request via: Pharmacy    Was the patient seen in the last year in this department? Yes    Does the patient have an active prescription (recently filled or refills available) for medication(s) requested? No    Pharmacy Name: [facility]    Does the patient have [facility] and need 100 day supply (blood pressure, diabetes and cholesterol meds only)? Patient does not have [facility]

## 2024-04-14 DIAGNOSIS — N40.1 BENIGN PROSTATIC HYPERPLASIA WITH LOWER URINARY TRACT SYMPTOMS, SYMPTOM DETAILS UNSPECIFIED: ICD-10-CM

## 2024-04-15 NOTE — TELEPHONE ENCOUNTER
Received request via: Pharmacy    Was the patient seen in the last year in this department? Yes    Does the patient have an active prescription (recently filled or refills available) for medication(s) requested? No    Pharmacy Name: [facility]    Does [facility] have [facility] and need 100 day supply (blood pressure, diabetes and cholesterol meds only)? Patient does not have [facility]

## 2024-04-16 RX ORDER — TAMSULOSIN HYDROCHLORIDE 0.4 MG/1
0.4 CAPSULE ORAL
Qty: 90 CAPSULE | Refills: 3 | Status: SHIPPED | OUTPATIENT
Start: 2024-04-16

## 2024-07-16 RX ORDER — PANTOPRAZOLE SODIUM 40 MG/1
TABLET, DELAYED RELEASE ORAL
Qty: 90 TABLET | Refills: 0 | Status: SHIPPED | OUTPATIENT
Start: 2024-07-16

## 2024-07-30 DIAGNOSIS — L30.9 DERMATITIS: ICD-10-CM

## 2024-07-30 RX ORDER — TRIAMCINOLONE ACETONIDE 1 MG/G
1 CREAM TOPICAL 2 TIMES DAILY
Qty: 28.4 G | Refills: 0 | Status: SHIPPED | OUTPATIENT
Start: 2024-07-30

## 2024-10-22 RX ORDER — PANTOPRAZOLE SODIUM 40 MG/1
TABLET, DELAYED RELEASE ORAL
Qty: 90 TABLET | Refills: 0 | Status: SHIPPED | OUTPATIENT
Start: 2024-10-22

## 2024-11-13 RX ORDER — PANTOPRAZOLE SODIUM 40 MG/1
TABLET, DELAYED RELEASE ORAL
Qty: 90 TABLET | Refills: 0 | Status: SHIPPED | OUTPATIENT
Start: 2024-11-13 | End: 2024-11-27 | Stop reason: SDUPTHER

## 2024-11-13 NOTE — TELEPHONE ENCOUNTER
Received request via: Pharmacy    Was the patient seen in the last year in this department? Yes    Does the patient have an active prescription (recently filled or refills available) for medication(s) requested? No    Pharmacy Name: [facility]     Does the [facility] have [facility] and need 100-day supply? (This applies to ALL medications) Patient does not have [facility]

## 2024-11-21 ENCOUNTER — OFFICE VISIT (OUTPATIENT)
Dept: MEDICAL GROUP | Facility: LAB | Age: 59
End: 2024-11-21
Payer: COMMERCIAL

## 2024-11-21 ENCOUNTER — HOSPITAL ENCOUNTER (OUTPATIENT)
Dept: LAB | Facility: MEDICAL CENTER | Age: 59
End: 2024-11-21
Attending: FAMILY MEDICINE
Payer: COMMERCIAL

## 2024-11-21 VITALS
SYSTOLIC BLOOD PRESSURE: 124 MMHG | RESPIRATION RATE: 14 BRPM | WEIGHT: 247.8 LBS | HEART RATE: 61 BPM | OXYGEN SATURATION: 95 % | TEMPERATURE: 96.9 F | HEIGHT: 71 IN | DIASTOLIC BLOOD PRESSURE: 72 MMHG | BODY MASS INDEX: 34.69 KG/M2

## 2024-11-21 DIAGNOSIS — Z00.00 WELL ADULT EXAM: ICD-10-CM

## 2024-11-21 DIAGNOSIS — Z11.4 SCREENING FOR HIV WITHOUT PRESENCE OF RISK FACTORS: ICD-10-CM

## 2024-11-21 DIAGNOSIS — Z13.1 SCREENING FOR DIABETES MELLITUS: ICD-10-CM

## 2024-11-21 DIAGNOSIS — Z11.59 ENCOUNTER FOR HEPATITIS C SCREENING TEST FOR LOW RISK PATIENT: ICD-10-CM

## 2024-11-21 DIAGNOSIS — I10 ESSENTIAL HYPERTENSION: ICD-10-CM

## 2024-11-21 DIAGNOSIS — K52.9 GASTROENTERITIS: ICD-10-CM

## 2024-11-21 DIAGNOSIS — Z12.5 SCREENING FOR PROSTATE CANCER: ICD-10-CM

## 2024-11-21 DIAGNOSIS — Z23 NEED FOR VACCINATION: ICD-10-CM

## 2024-11-21 LAB
ALBUMIN SERPL BCP-MCNC: 4.4 G/DL (ref 3.2–4.9)
ALBUMIN/GLOB SERPL: 1.5 G/DL
ALP SERPL-CCNC: 51 U/L (ref 30–99)
ALT SERPL-CCNC: 27 U/L (ref 2–50)
ANION GAP SERPL CALC-SCNC: 12 MMOL/L (ref 7–16)
AST SERPL-CCNC: 29 U/L (ref 12–45)
BASOPHILS # BLD AUTO: 1.3 % (ref 0–1.8)
BASOPHILS # BLD: 0.07 K/UL (ref 0–0.12)
BILIRUB SERPL-MCNC: 0.8 MG/DL (ref 0.1–1.5)
BUN SERPL-MCNC: 22 MG/DL (ref 8–22)
CALCIUM ALBUM COR SERPL-MCNC: 8.9 MG/DL (ref 8.5–10.5)
CALCIUM SERPL-MCNC: 9.2 MG/DL (ref 8.5–10.5)
CHLORIDE SERPL-SCNC: 103 MMOL/L (ref 96–112)
CHOLEST SERPL-MCNC: 193 MG/DL (ref 100–199)
CO2 SERPL-SCNC: 24 MMOL/L (ref 20–33)
CREAT SERPL-MCNC: 1.1 MG/DL (ref 0.5–1.4)
EOSINOPHIL # BLD AUTO: 0.1 K/UL (ref 0–0.51)
EOSINOPHIL NFR BLD: 1.9 % (ref 0–6.9)
ERYTHROCYTE [DISTWIDTH] IN BLOOD BY AUTOMATED COUNT: 39.6 FL (ref 35.9–50)
EST. AVERAGE GLUCOSE BLD GHB EST-MCNC: 111 MG/DL
GFR SERPLBLD CREATININE-BSD FMLA CKD-EPI: 76 ML/MIN/1.73 M 2
GLOBULIN SER CALC-MCNC: 3 G/DL (ref 1.9–3.5)
GLUCOSE SERPL-MCNC: 92 MG/DL (ref 65–99)
HBA1C MFR BLD: 5.5 % (ref 4–5.6)
HCT VFR BLD AUTO: 49.3 % (ref 42–52)
HCV AB SER QL: NORMAL
HDLC SERPL-MCNC: 26 MG/DL
HGB BLD-MCNC: 16.4 G/DL (ref 14–18)
HIV 1+2 AB+HIV1 P24 AG SERPL QL IA: NORMAL
IMM GRANULOCYTES # BLD AUTO: 0.01 K/UL (ref 0–0.11)
IMM GRANULOCYTES NFR BLD AUTO: 0.2 % (ref 0–0.9)
LDLC SERPL CALC-MCNC: ABNORMAL MG/DL
LYMPHOCYTES # BLD AUTO: 1.51 K/UL (ref 1–4.8)
LYMPHOCYTES NFR BLD: 28.5 % (ref 22–41)
MCH RBC QN AUTO: 28.3 PG (ref 27–33)
MCHC RBC AUTO-ENTMCNC: 33.3 G/DL (ref 32.3–36.5)
MCV RBC AUTO: 85.1 FL (ref 81.4–97.8)
MONOCYTES # BLD AUTO: 0.51 K/UL (ref 0–0.85)
MONOCYTES NFR BLD AUTO: 9.6 % (ref 0–13.4)
NEUTROPHILS # BLD AUTO: 3.1 K/UL (ref 1.82–7.42)
NEUTROPHILS NFR BLD: 58.5 % (ref 44–72)
NRBC # BLD AUTO: 0 K/UL
NRBC BLD-RTO: 0 /100 WBC (ref 0–0.2)
PLATELET # BLD AUTO: 307 K/UL (ref 164–446)
PMV BLD AUTO: 9.8 FL (ref 9–12.9)
POTASSIUM SERPL-SCNC: 3.8 MMOL/L (ref 3.6–5.5)
PROT SERPL-MCNC: 7.4 G/DL (ref 6–8.2)
PSA SERPL DL<=0.01 NG/ML-MCNC: 1.19 NG/ML (ref 0–4)
RBC # BLD AUTO: 5.79 M/UL (ref 4.7–6.1)
SODIUM SERPL-SCNC: 139 MMOL/L (ref 135–145)
TRIGL SERPL-MCNC: 409 MG/DL (ref 0–149)
WBC # BLD AUTO: 5.3 K/UL (ref 4.8–10.8)

## 2024-11-21 PROCEDURE — 84153 ASSAY OF PSA TOTAL: CPT

## 2024-11-21 PROCEDURE — 99214 OFFICE O/P EST MOD 30 MIN: CPT | Mod: 25 | Performed by: FAMILY MEDICINE

## 2024-11-21 PROCEDURE — 90656 IIV3 VACC NO PRSV 0.5 ML IM: CPT | Performed by: FAMILY MEDICINE

## 2024-11-21 PROCEDURE — 3078F DIAST BP <80 MM HG: CPT | Performed by: FAMILY MEDICINE

## 2024-11-21 PROCEDURE — 86803 HEPATITIS C AB TEST: CPT

## 2024-11-21 PROCEDURE — 90471 IMMUNIZATION ADMIN: CPT | Performed by: FAMILY MEDICINE

## 2024-11-21 PROCEDURE — 3074F SYST BP LT 130 MM HG: CPT | Performed by: FAMILY MEDICINE

## 2024-11-21 PROCEDURE — 85025 COMPLETE CBC W/AUTO DIFF WBC: CPT

## 2024-11-21 PROCEDURE — 83036 HEMOGLOBIN GLYCOSYLATED A1C: CPT

## 2024-11-21 PROCEDURE — 36415 COLL VENOUS BLD VENIPUNCTURE: CPT

## 2024-11-21 PROCEDURE — 87389 HIV-1 AG W/HIV-1&-2 AB AG IA: CPT

## 2024-11-21 PROCEDURE — 80053 COMPREHEN METABOLIC PANEL: CPT

## 2024-11-21 PROCEDURE — 80061 LIPID PANEL: CPT

## 2024-11-21 ASSESSMENT — PATIENT HEALTH QUESTIONNAIRE - PHQ9: CLINICAL INTERPRETATION OF PHQ2 SCORE: 0

## 2024-11-21 ASSESSMENT — FIBROSIS 4 INDEX: FIB4 SCORE: 0.63

## 2024-11-21 NOTE — PROGRESS NOTES
"Subjective:     CC: GI symptoms    HPI:   Rene presents today with concern for GI symptoms.  Symptoms began 1 month ago after trip to [place].  Initially with severe nonbloody diarrhea that lasted for up to 3 weeks about 4 to 5 months/day initially and then slowly tapered off.  No fevers, no vomiting.  No specific treatment for this, no antibiotics.  This has since resolved but he still having occasional stomach tightness or pain that has improved this week.  This is occasional and not persistent.  Has noted that sometimes it can be worse with activity.  He is having regular soft bowel movements daily.  No heartburn.      Current Outpatient Medications   Medication Sig Dispense Refill    pantoprazole (PROTONIX) 40 MG Tablet Delayed Response TAKE 1 TABLET BY MOUTH EVERY DAY 30 MINUTES BEFORE BREAKFAST MEAL 90 Tablet 0    tamsulosin (FLOMAX) 0.4 MG capsule TAKE 1 CAPSULE 1/2 HOUR AFTER BREAKFAST 90 Capsule 3    olmesartan (BENICAR) 20 MG Tab Take 1 Tablet by mouth every day. 90 Tablet 3    simvastatin (ZOCOR) 10 MG Tab TAKE 1 TABLET EVERY EVENING 90 Tablet 3    celecoxib (CELEBREX) 200 MG Cap Take 1 Capsule by mouth every day. 10 Capsule 3    triamcinolone acetonide (KENALOG) 0.1 % Cream Apply 1 Application topically 2 times a day. (Patient not taking: Reported on 11/21/2024) 28.4 g 0     No current facility-administered medications for this visit.       Medications, past medical history, allergies, and social history have been reviewed and updated.      Objective:       Exam:  /72 (BP Location: Left arm, Patient Position: Sitting, BP Cuff Size: Adult)   Pulse 61   Temp 36.1 °C (96.9 °F) (Temporal)   Resp 14   Ht 1.803 m (5' 11\")   Wt 112 kg (247 lb 12.8 oz)   SpO2 95%   BMI 34.56 kg/m²  Body mass index is 34.56 kg/m².    Constitutional: Alert. Well appearing. No distress.  Skin: Warm, dry, good turgor, no visible rashes.  Respiratory: Normal effort.   Abdomen: Soft, nondistended.  There is mild tenderness " to the midline lower abdomen and the left lower quadrant with deep palpation.  No rebound or guarding.  Neuro: Moves all four extremities. No facial droop.  Psych: Answers questions appropriately. Normal affect and mood.      Assessment & Plan:     59 y.o. male with the following -     1. Gastroenteritis  Symptoms began almost 1 month ago with watery diarrhea after trip to [place].  This has generally resolved but still having some improving occasional abdominal pain mild tenderness on exam.  Given overall improvement we will hold on additional workup for now.  Would consider CT imaging if the pain and tenderness continue.    2. Essential hypertension  Blood pressure at goal.  Continue Benicar 20 mg daily.    3. Need for vaccination  - INFLUENZA VACCINE TRI INJ (PF)    4. Well adult exam  - Comp Metabolic Panel; Future  - CBC WITH DIFFERENTIAL; Future  - Lipid Profile; Future  - PROSTATE SPECIFIC AG SCREENING; Future    5. Screening for diabetes mellitus  - HEMOGLOBIN A1C; Future    6. Screening for prostate cancer  - PROSTATE SPECIFIC AG SCREENING; Future    7. Encounter for hepatitis C screening test for low risk patient  - HEP C VIRUS ANTIBODY; Future    8. Screening for HIV without presence of risk factors  - HIV AG/AB COMBO ASSAY SCREENING; Future      Please note that this note was created using voice recognition software.

## 2024-11-27 RX ORDER — PANTOPRAZOLE SODIUM 40 MG/1
TABLET, DELAYED RELEASE ORAL
Qty: 90 TABLET | Refills: 0 | Status: SHIPPED | OUTPATIENT
Start: 2024-11-27

## 2025-02-23 DIAGNOSIS — E78.2 MIXED HYPERLIPIDEMIA: ICD-10-CM

## 2025-02-23 DIAGNOSIS — R93.1 AGATSTON CAC SCORE, <100: ICD-10-CM

## 2025-02-24 RX ORDER — SIMVASTATIN 10 MG
10 TABLET ORAL EVERY EVENING
Qty: 90 TABLET | Refills: 3 | Status: SHIPPED | OUTPATIENT
Start: 2025-02-24

## 2025-03-16 DIAGNOSIS — I10 PRIMARY HYPERTENSION: ICD-10-CM

## 2025-03-17 RX ORDER — OLMESARTAN MEDOXOMIL 20 MG/1
20 TABLET ORAL DAILY
Qty: 90 TABLET | Refills: 3 | Status: SHIPPED | OUTPATIENT
Start: 2025-03-17

## 2025-04-09 DIAGNOSIS — N40.1 BENIGN PROSTATIC HYPERPLASIA WITH LOWER URINARY TRACT SYMPTOMS, SYMPTOM DETAILS UNSPECIFIED: ICD-10-CM

## 2025-04-10 RX ORDER — TAMSULOSIN HYDROCHLORIDE 0.4 MG/1
0.4 CAPSULE ORAL
Qty: 90 CAPSULE | Refills: 3 | Status: SHIPPED | OUTPATIENT
Start: 2025-04-10

## 2025-04-10 NOTE — TELEPHONE ENCOUNTER
Received request via: Pharmacy    Was the patient seen in the last year in this department? Yes    Does the patient have an active prescription (recently filled or refills available) for medication(s) requested? No    Pharmacy Name: [facility]     Does the patient have [facility] and need 100-day supply? (This applies to ALL medications) Patient does not have [facility]

## 2025-04-17 ENCOUNTER — PATIENT MESSAGE (OUTPATIENT)
Dept: MEDICAL GROUP | Facility: LAB | Age: 60
End: 2025-04-17
Payer: COMMERCIAL

## 2025-04-17 RX ORDER — PANTOPRAZOLE SODIUM 40 MG/1
TABLET, DELAYED RELEASE ORAL
Qty: 90 TABLET | Refills: 3 | Status: SHIPPED | OUTPATIENT
Start: 2025-04-17

## 2025-04-17 NOTE — PATIENT COMMUNICATION
Received request via: Patient    Was the patient seen in the last year in this department? Yes 11/2024    Does the patient have an active prescription (recently filled or refills available) for medication(s) requested? No    Pharmacy Name: [facility]     Does the patient have [facility] and need 100-day supply? (This applies to ALL medications) Patient does not have [facility]  Requested Prescriptions     Pending Prescriptions Disp Refills    pantoprazole (PROTONIX) 40 MG Tablet Delayed Response 90 Tablet 0     Sig: TAKE 1 TABLET BY MOUTH EVERY DAY 30 MINUTES BEFORE BREAKFAST MEAL

## 2025-05-20 RX ORDER — CELECOXIB 200 MG/1
200 CAPSULE ORAL DAILY
Qty: 10 CAPSULE | Refills: 3 | Status: SHIPPED | OUTPATIENT
Start: 2025-05-20

## 2025-08-05 RX ORDER — PANTOPRAZOLE SODIUM 40 MG/1
TABLET, DELAYED RELEASE ORAL
Qty: 90 TABLET | Refills: 3 | Status: SHIPPED | OUTPATIENT
Start: 2025-08-05